# Patient Record
Sex: FEMALE | Race: WHITE | NOT HISPANIC OR LATINO
[De-identification: names, ages, dates, MRNs, and addresses within clinical notes are randomized per-mention and may not be internally consistent; named-entity substitution may affect disease eponyms.]

---

## 2021-08-15 PROBLEM — Z00.00 ENCOUNTER FOR PREVENTIVE HEALTH EXAMINATION: Status: ACTIVE | Noted: 2021-08-15

## 2021-08-31 ENCOUNTER — APPOINTMENT (OUTPATIENT)
Dept: PLASTIC SURGERY | Facility: CLINIC | Age: 56
End: 2021-08-31
Payer: COMMERCIAL

## 2021-08-31 VITALS
WEIGHT: 115.03 LBS | HEART RATE: 92 BPM | OXYGEN SATURATION: 98 % | SYSTOLIC BLOOD PRESSURE: 141 MMHG | TEMPERATURE: 97.3 F | DIASTOLIC BLOOD PRESSURE: 88 MMHG | RESPIRATION RATE: 16 BRPM | HEIGHT: 63 IN | BODY MASS INDEX: 20.38 KG/M2

## 2021-08-31 PROCEDURE — 99204 OFFICE O/P NEW MOD 45 MIN: CPT

## 2021-09-09 NOTE — ASSESSMENT
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 \par \par I feel that it is a reasonable plan to go to the operating room to remove both breast implants, and perform bilateral periprosthetic capsulectomies. In addition to addressing the above concerns, this may make breast cancer surveillance easier in the future.\par \par I advised that, given the extent of the injury to skin from the implants, she  will need a reconstructive mastopexy given breast shape, significant ptosis, skin quality, and will plan to do a mastopexy at the time of explantation.  \par \par Risks, benefits, and alternatives were discussed including the risks of infection, bleeding, seroma, hematoma, asymmetry, nipple necrosis, change in sensitivity of nipples, change in breast skin sensation, fat necrosis, oil cysts, poor scarring, keloid formation, hypertrophic scarring, dehiscence, and delayed wound healing.  The patient understands these risks, all questions were answered and she wishes to proceed with bilateral breast implant removal, bilateral periprosthetic capsulectomy, bilateral mastopexy,  to restore volume lost to atrophy directly related to the implants.  Informed consent was obtained.\par \par The plan is for removal of implants, capsulectomy, mastopexy, and re-insertion of silicone implants and alloderm\par \par

## 2021-09-09 NOTE — HISTORY OF PRESENT ILLNESS
[FreeTextEntry1] : Ms. 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 She  complains of bilateral breast pain associated with her  breast implants. She  states that the left breast pain is worse than the right.  She  is unable to sleep on her  stomach due to severe pain associated with the breast implants.  She  is also unable to do activities of daily life like running, biking, and lifting. \par \par Pt had her first Augmentation in 1991 which brought her from a B cup to a C cup, pt then suffered left side capsular contracture and rupture.  She had a second implant replacement surgery in 2008 with 350 cc silicone implants which brought her from a C to a D. \par \par She  states that she  has never felt comfortable with implants and is worried about the health risks.  She is concerned about the risk of anaplastic large cell lymphoma.\par \par She  has worried about the health implications of the silicone implants, and she  has had some systemic issues which she  is unsure are related to the implants.  Specifically has complaints of joint pain, hair loss, chronic fatigue, vision changes, and poor memory. She  has been recommended to have the implants removed. \par 	\par She  does not wish to have any more implants placed and I would not recommend doing that given the health concerns, and concerns for ALCL, and the painful bilateral capsular contractures\par \par

## 2021-10-19 ENCOUNTER — APPOINTMENT (OUTPATIENT)
Dept: PLASTIC SURGERY | Facility: CLINIC | Age: 56
End: 2021-10-19
Payer: COMMERCIAL

## 2021-10-19 VITALS
SYSTOLIC BLOOD PRESSURE: 157 MMHG | HEIGHT: 63 IN | RESPIRATION RATE: 16 BRPM | WEIGHT: 118 LBS | HEART RATE: 74 BPM | DIASTOLIC BLOOD PRESSURE: 93 MMHG | BODY MASS INDEX: 20.91 KG/M2 | OXYGEN SATURATION: 100 % | TEMPERATURE: 97.5 F

## 2021-10-19 PROCEDURE — 99214 OFFICE O/P EST MOD 30 MIN: CPT

## 2021-11-02 NOTE — HISTORY OF PRESENT ILLNESS
[FreeTextEntry1] : Ms. 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 She  complains of bilateral breast pain associated with her  breast implants. She  states that the left breast pain is worse than the right.  She  is unable to sleep on her  stomach due to severe pain associated with the breast implants.  She  is also unable to do activities of daily life like running, biking, and lifting. \par \par Pt had her first Augmentation in 1991 which brought her from a B cup to a C cup, pt then suffered left side capsular contracture and rupture.  She had a second implant replacement surgery in 2008 with 350 cc silicone implants which brought her from a C to a D. \par \par She  states that she  has never felt comfortable with implants and is worried about the health risks.  She is concerned about the risk of anaplastic large cell lymphoma.\par \par She  has worried about the health implications of the silicone implants, and she  has had some systemic issues which she  is unsure are related to the implants.  Specifically has complaints of joint pain, hair loss, chronic fatigue, vision changes, and poor memory. She  has been recommended to have the implants removed. \par 	\par She  does not wish to have any more implants placed and I would not recommend doing that given the health concerns, and concerns for ALCL, and the painful bilateral capsular contractures\par \par Pt has more questions about surgey\par

## 2021-11-02 NOTE — PHYSICAL EXAM
[NI] : Normal [de-identified] : Please see scanned in breast sheet\par SN-N: L - 26, R - 25 1/2\par N-IMF: L - 13, R - 12\par BW: L/R - 10 1/2

## 2021-11-02 NOTE — ASSESSMENT
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 \par \par I feel that it is a reasonable plan to go to the operating room to remove both breast implants, and perform bilateral periprosthetic capsulectomies. In addition to addressing the above concerns, this may make breast cancer surveillance easier in the future.\par \par I advised that, given the extent of the injury to skin from the implants, she  will need a reconstructive mastopexy given breast shape, significant ptosis, skin quality, and will plan to do a mastopexy at the time of explantation.  \par \par Risks, benefits, and alternatives were discussed including the risks of infection, bleeding, seroma, hematoma, asymmetry, nipple necrosis, change in sensitivity of nipples, change in breast skin sensation, fat necrosis, oil cysts, poor scarring, keloid formation, hypertrophic scarring, dehiscence, and delayed wound healing.  The patient understands these risks, all questions were answered and she wishes to proceed with bilateral breast implant removal, bilateral periprosthetic capsulectomy, bilateral reconstructive mastopexy to restore volume lost to atrophy directly related to the implants.  Informed consent was obtained.\par \par \par Pt would like to think about her options and will get back to us if she wants her implants removed or replaced\par

## 2021-11-03 ENCOUNTER — APPOINTMENT (OUTPATIENT)
Dept: PLASTIC SURGERY | Facility: HOSPITAL | Age: 56
End: 2021-11-03

## 2022-01-11 ENCOUNTER — OUTPATIENT (OUTPATIENT)
Dept: OUTPATIENT SERVICES | Facility: HOSPITAL | Age: 57
LOS: 1 days | End: 2022-01-11
Payer: COMMERCIAL

## 2022-01-11 ENCOUNTER — APPOINTMENT (OUTPATIENT)
Dept: PLASTIC SURGERY | Facility: CLINIC | Age: 57
End: 2022-01-11
Payer: COMMERCIAL

## 2022-01-11 VITALS
HEART RATE: 83 BPM | RESPIRATION RATE: 14 BRPM | OXYGEN SATURATION: 98 % | SYSTOLIC BLOOD PRESSURE: 142 MMHG | HEIGHT: 63 IN | BODY MASS INDEX: 21.09 KG/M2 | DIASTOLIC BLOOD PRESSURE: 78 MMHG | WEIGHT: 119 LBS | TEMPERATURE: 97.4 F

## 2022-01-11 VITALS
HEART RATE: 82 BPM | WEIGHT: 118.83 LBS | TEMPERATURE: 98 F | RESPIRATION RATE: 16 BRPM | HEIGHT: 63 IN | DIASTOLIC BLOOD PRESSURE: 80 MMHG | SYSTOLIC BLOOD PRESSURE: 124 MMHG

## 2022-01-11 DIAGNOSIS — Z01.818 ENCOUNTER FOR OTHER PREPROCEDURAL EXAMINATION: ICD-10-CM

## 2022-01-11 DIAGNOSIS — Z98.82 BREAST IMPLANT STATUS: ICD-10-CM

## 2022-01-11 DIAGNOSIS — Z98.82 BREAST IMPLANT STATUS: Chronic | ICD-10-CM

## 2022-01-11 DIAGNOSIS — T85.848A PAIN DUE TO OTHER INTERNAL PROSTHETIC DEVICES, IMPLANTS AND GRAFTS, INITIAL ENCOUNTER: ICD-10-CM

## 2022-01-11 DIAGNOSIS — T85.44XA CAPSULAR CONTRACTURE OF BREAST IMPLANT, INITIAL ENCOUNTER: ICD-10-CM

## 2022-01-11 DIAGNOSIS — Z29.9 ENCOUNTER FOR PROPHYLACTIC MEASURES, UNSPECIFIED: ICD-10-CM

## 2022-01-11 DIAGNOSIS — Z98.891 HISTORY OF UTERINE SCAR FROM PREVIOUS SURGERY: Chronic | ICD-10-CM

## 2022-01-11 LAB
ANION GAP SERPL CALC-SCNC: 11 MMOL/L — SIGNIFICANT CHANGE UP (ref 5–17)
APTT BLD: 29.6 SEC — SIGNIFICANT CHANGE UP (ref 27.5–35.5)
BASOPHILS # BLD AUTO: 0.04 K/UL — SIGNIFICANT CHANGE UP (ref 0–0.2)
BASOPHILS NFR BLD AUTO: 0.4 % — SIGNIFICANT CHANGE UP (ref 0–2)
BLD GP AB SCN SERPL QL: SIGNIFICANT CHANGE UP
BUN SERPL-MCNC: 11.2 MG/DL — SIGNIFICANT CHANGE UP (ref 8–20)
CALCIUM SERPL-MCNC: 9.9 MG/DL — SIGNIFICANT CHANGE UP (ref 8.6–10.2)
CHLORIDE SERPL-SCNC: 102 MMOL/L — SIGNIFICANT CHANGE UP (ref 98–107)
CO2 SERPL-SCNC: 26 MMOL/L — SIGNIFICANT CHANGE UP (ref 22–29)
CREAT SERPL-MCNC: 0.57 MG/DL — SIGNIFICANT CHANGE UP (ref 0.5–1.3)
EOSINOPHIL # BLD AUTO: 0.01 K/UL — SIGNIFICANT CHANGE UP (ref 0–0.5)
EOSINOPHIL NFR BLD AUTO: 0.1 % — SIGNIFICANT CHANGE UP (ref 0–6)
GLUCOSE SERPL-MCNC: 89 MG/DL — SIGNIFICANT CHANGE UP (ref 70–99)
HCT VFR BLD CALC: 42.6 % — SIGNIFICANT CHANGE UP (ref 34.5–45)
HGB BLD-MCNC: 13.5 G/DL — SIGNIFICANT CHANGE UP (ref 11.5–15.5)
IMM GRANULOCYTES NFR BLD AUTO: 0.2 % — SIGNIFICANT CHANGE UP (ref 0–1.5)
INR BLD: 0.95 RATIO — SIGNIFICANT CHANGE UP (ref 0.88–1.16)
LYMPHOCYTES # BLD AUTO: 2.54 K/UL — SIGNIFICANT CHANGE UP (ref 1–3.3)
LYMPHOCYTES # BLD AUTO: 27.2 % — SIGNIFICANT CHANGE UP (ref 13–44)
MCHC RBC-ENTMCNC: 30.4 PG — SIGNIFICANT CHANGE UP (ref 27–34)
MCHC RBC-ENTMCNC: 31.7 GM/DL — LOW (ref 32–36)
MCV RBC AUTO: 95.9 FL — SIGNIFICANT CHANGE UP (ref 80–100)
MONOCYTES # BLD AUTO: 0.64 K/UL — SIGNIFICANT CHANGE UP (ref 0–0.9)
MONOCYTES NFR BLD AUTO: 6.9 % — SIGNIFICANT CHANGE UP (ref 2–14)
NEUTROPHILS # BLD AUTO: 6.09 K/UL — SIGNIFICANT CHANGE UP (ref 1.8–7.4)
NEUTROPHILS NFR BLD AUTO: 65.2 % — SIGNIFICANT CHANGE UP (ref 43–77)
PLATELET # BLD AUTO: 477 K/UL — HIGH (ref 150–400)
POTASSIUM SERPL-MCNC: 4.7 MMOL/L — SIGNIFICANT CHANGE UP (ref 3.5–5.3)
POTASSIUM SERPL-SCNC: 4.7 MMOL/L — SIGNIFICANT CHANGE UP (ref 3.5–5.3)
PROTHROM AB SERPL-ACNC: 11 SEC — SIGNIFICANT CHANGE UP (ref 10.6–13.6)
RBC # BLD: 4.44 M/UL — SIGNIFICANT CHANGE UP (ref 3.8–5.2)
RBC # FLD: 11.9 % — SIGNIFICANT CHANGE UP (ref 10.3–14.5)
SODIUM SERPL-SCNC: 139 MMOL/L — SIGNIFICANT CHANGE UP (ref 135–145)
WBC # BLD: 9.34 K/UL — SIGNIFICANT CHANGE UP (ref 3.8–10.5)
WBC # FLD AUTO: 9.34 K/UL — SIGNIFICANT CHANGE UP (ref 3.8–10.5)

## 2022-01-11 PROCEDURE — G0463: CPT

## 2022-01-11 PROCEDURE — 93005 ELECTROCARDIOGRAM TRACING: CPT

## 2022-01-11 PROCEDURE — 93010 ELECTROCARDIOGRAM REPORT: CPT

## 2022-01-11 PROCEDURE — 99214 OFFICE O/P EST MOD 30 MIN: CPT

## 2022-01-11 RX ORDER — CEFAZOLIN SODIUM 1 G
2000 VIAL (EA) INJECTION ONCE
Refills: 0 | Status: DISCONTINUED | OUTPATIENT
Start: 2022-01-28 | End: 2022-02-11

## 2022-01-11 RX ORDER — SODIUM CHLORIDE 9 MG/ML
3 INJECTION INTRAMUSCULAR; INTRAVENOUS; SUBCUTANEOUS ONCE
Refills: 0 | Status: DISCONTINUED | OUTPATIENT
Start: 2022-01-28 | End: 2022-02-11

## 2022-01-11 NOTE — H&P PST ADULT - NSICDXPASTMEDICALHX_GEN_ALL_CORE_FT
PAST MEDICAL HISTORY:  Breast implant status 2007    Pain from breast implant      PAST MEDICAL HISTORY:  Breast implant status 2007    Capsular contracture of breast implant, initial encounter     Pain from breast implant

## 2022-01-11 NOTE — ASU PATIENT PROFILE, ADULT - FALL HARM RISK - UNIVERSAL INTERVENTIONS
Bed in lowest position, wheels locked, appropriate side rails in place/Call bell, personal items and telephone in reach/Instruct patient to call for assistance before getting out of bed or chair/Non-slip footwear when patient is out of bed/[place] to call system/Physically safe environment - no spills, clutter or unnecessary equipment/Purposeful Proactive Rounding/Room/bathroom lighting operational, light cord in reach

## 2022-01-11 NOTE — H&P PST ADULT - NSICDXFAMILYHX_GEN_ALL_CORE_FT
FAMILY HISTORY:  Father  Still living? Yes, Estimated age: Age Unknown  FHx: atrial fibrillation, Age at diagnosis: Age Unknown    Mother  Still living? Yes, Estimated age: Age Unknown  Family history of TIAs, Age at diagnosis: Age Unknown

## 2022-01-11 NOTE — H&P PST ADULT - NSICDXPASTSURGICALHX_GEN_ALL_CORE_FT
PAST SURGICAL HISTORY:  History of  x3     PAST SURGICAL HISTORY:  H/O breast augmentation     History of  x3

## 2022-01-11 NOTE — H&P PST ADULT - ASSESSMENT
Pt 57 y/o female seen today pre-op Removal of bilateral implant. Pt has a history of bilateral breast augmentation since 2007, endorsed she has developed bilateral breast pain associated with periprosthetic scarring. Pt report left breast pain is worse than her right breast, difficulty with lifting due to breast pain.  Berkley breast discharge, denies discoloration,  denies nipple skin changes, denies nipple retraction, denies mass. Pt seen today for a scheduled surgery on 22 with Dr. Owen. Surgery protocol reviewed with pt today.   MARALI VTE 2.0 SCORE [CLOT updated 2019]    AGE RELATED RISK FACTORS                                                       MOBILITY RELATED FACTORS  [x ] Age 41-60 years                                            (1 Point)                    [ ] Bed rest                                                        (1 Point)  [ ] Age: 61-74 years                                           (2 Points)                  [ ] Plaster cast                                                   (2 Points)  [ ] Age= 75 years                                              (3 Points)                    [ ] Bed bound for more than 72 hours                 (2 Points)    DISEASE RELATED RISK FACTORS                                               GENDER SPECIFIC FACTORS  [ ] Edema in the lower extremities                       (1 Point)              [ ] Pregnancy                                                     (1 Point)  [ ] Varicose veins                                               (1 Point)                     [ ] Post-partum < 6 weeks                                   (1 Point)             [ ] BMI > 25 Kg/m2                                            (1 Point)                     [ ] Hormonal therapy  or oral contraception          (1 Point)                 [ ] Sepsis (in the previous month)                        (1 Point)               [ ] History of pregnancy complications                 (1 point)  [ ] Pneumonia or serious lung disease                                               [ ] Unexplained or recurrent                     (1 Point)           (in the previous month)                               (1 Point)  [ ] Abnormal pulmonary function test                     (1 Point)                 SURGERY RELATED RISK FACTORS  [ ] Acute myocardial infarction                              (1 Point)               [ ]  Section                                             (1 Point)  [ ] Congestive heart failure (in the previous month)  (1 Point)      [ ] Minor surgery                                                  (1 Point)   [ ] Inflammatory bowel disease                             (1 Point)               [ ] Arthroscopic surgery                                        (2 Points)  [ ] Central venous access                                      (2 Points)                [x ] General surgery lasting more than 45 minutes (2 points)  [ ] Malignancy- Present or previous                   (2 Points)                [ ] Elective arthroplasty                                         (5 points)    [ ] Stroke (in the previous month)                          (5 Points)                                                                                                                                                           HEMATOLOGY RELATED FACTORS                                                 TRAUMA RELATED RISK FACTORS  [ ] Prior episodes of VTE                                     (3 Points)                [ ] Fracture of the hip, pelvis, or leg                       (5 Points)  [ ] Positive family history for VTE                         (3 Points)             [ ] Acute spinal cord injury (in the previous month)  (5 Points)  [ ] Prothrombin 59277 A                                     (3 Points)               [ ] Paralysis  (less than 1 month)                             (5 Points)  [ ] Factor V Leiden                                             (3 Points)                  [ ] Multiple Trauma within 1 month                        (5 Points)  [ ] Lupus anticoagulants                                     (3 Points)                                                           [ ] Anticardiolipin antibodies                               (3 Points)                                                       [ ] High homocysteine in the blood                      (3 Points)                                             [ ] Other congenital or acquired thrombophilia      (3 Points)                                                [ ] Heparin induced thrombocytopenia                  (3 Points)                                     Total Score [        3  ]  OPIOID RISK TOOL    LAITH EACH BOX THAT APPLIES AND ADD TOTALS AT THE END    FAMILY HISTORY OF SUBSTANCE ABUSE                 FEMALE         MALE                                                Alcohol                             [  ]1 pt          [  ]3pts                                               Illegal Durgs                     [  ]2 pts        [  ]3pts                                               Rx Drugs                           [  ]4 pts        [  ]4 pts    PERSONAL HISTORY OF SUBSTANCE ABUSE                                                                                          Alcohol                             [  ]3 pts       [  ]3 pts                                               Illegal Drugs                     [  ]4 pts        [  ]4 pts                                               Rx Drugs                           [  ]5 pts        [  ]5 pts    AGE BETWEEN 16-45 YEARS                                      [  ]1 pt         [  ]1 pt    HISTORY OF PREADOLESCENT   SEXUAL ABUSE                                                             [  ]3 pts        [  ]0pts    PSYCHOLOGICAL DISEASE                     ADD, OCD, Bipolar, Schizophrenia        [  ]2 pts         [  ]2 pts                      Depression                                               [  ]1 pt           [  ]1 pt           SCORING TOTAL   (add numbers and type here)              (*0**)                                     A score of 3 or lower indicated LOW risk for future opioid abuse  A score of 4 to 7 indicated moderate risk for future opioid abuse  A score of 8 or higher indicates a high risk for opioid abuse Pt 55 y/o female seen today pre-op Removal of bilateral implants, capsulectomy, muscle repair.  Pt has a history of bilateral breast augmentation since 2007, endorsed she has developed bilateral breast pain associated with periprosthetic scarring. Pt report left breast pain is worse than her right breast, difficulty with lifting due to breast pain.  Berkley breast discharge, denies discoloration,  denies nipple skin changes, denies nipple retraction, denies mass. Pt seen today for a scheduled surgery on 22 with Dr. Owen. Surgery protocol reviewed with pt today.   MARALI VTE 2.0 SCORE [CLOT updated 2019]    AGE RELATED RISK FACTORS                                                       MOBILITY RELATED FACTORS  [x ] Age 41-60 years                                            (1 Point)                    [ ] Bed rest                                                        (1 Point)  [ ] Age: 61-74 years                                           (2 Points)                  [ ] Plaster cast                                                   (2 Points)  [ ] Age= 75 years                                              (3 Points)                    [ ] Bed bound for more than 72 hours                 (2 Points)    DISEASE RELATED RISK FACTORS                                               GENDER SPECIFIC FACTORS  [ ] Edema in the lower extremities                       (1 Point)              [ ] Pregnancy                                                     (1 Point)  [ ] Varicose veins                                               (1 Point)                     [ ] Post-partum < 6 weeks                                   (1 Point)             [ ] BMI > 25 Kg/m2                                            (1 Point)                     [ ] Hormonal therapy  or oral contraception          (1 Point)                 [ ] Sepsis (in the previous month)                        (1 Point)               [ ] History of pregnancy complications                 (1 point)  [ ] Pneumonia or serious lung disease                                               [ ] Unexplained or recurrent                     (1 Point)           (in the previous month)                               (1 Point)  [ ] Abnormal pulmonary function test                     (1 Point)                 SURGERY RELATED RISK FACTORS  [ ] Acute myocardial infarction                              (1 Point)               [ ]  Section                                             (1 Point)  [ ] Congestive heart failure (in the previous month)  (1 Point)      [ ] Minor surgery                                                  (1 Point)   [ ] Inflammatory bowel disease                             (1 Point)               [ ] Arthroscopic surgery                                        (2 Points)  [ ] Central venous access                                      (2 Points)                [x ] General surgery lasting more than 45 minutes (2 points)  [ ] Malignancy- Present or previous                   (2 Points)                [ ] Elective arthroplasty                                         (5 points)    [ ] Stroke (in the previous month)                          (5 Points)                                                                                                                                                           HEMATOLOGY RELATED FACTORS                                                 TRAUMA RELATED RISK FACTORS  [ ] Prior episodes of VTE                                     (3 Points)                [ ] Fracture of the hip, pelvis, or leg                       (5 Points)  [ ] Positive family history for VTE                         (3 Points)             [ ] Acute spinal cord injury (in the previous month)  (5 Points)  [ ] Prothrombin 24822 A                                     (3 Points)               [ ] Paralysis  (less than 1 month)                             (5 Points)  [ ] Factor V Leiden                                             (3 Points)                  [ ] Multiple Trauma within 1 month                        (5 Points)  [ ] Lupus anticoagulants                                     (3 Points)                                                           [ ] Anticardiolipin antibodies                               (3 Points)                                                       [ ] High homocysteine in the blood                      (3 Points)                                             [ ] Other congenital or acquired thrombophilia      (3 Points)                                                [ ] Heparin induced thrombocytopenia                  (3 Points)                                     Total Score [        3  ]  OPIOID RISK TOOL    LAITH EACH BOX THAT APPLIES AND ADD TOTALS AT THE END    FAMILY HISTORY OF SUBSTANCE ABUSE                 FEMALE         MALE                                                Alcohol                             [  ]1 pt          [  ]3pts                                               Illegal Durgs                     [  ]2 pts        [  ]3pts                                               Rx Drugs                           [  ]4 pts        [  ]4 pts    PERSONAL HISTORY OF SUBSTANCE ABUSE                                                                                          Alcohol                             [  ]3 pts       [  ]3 pts                                               Illegal Drugs                     [  ]4 pts        [  ]4 pts                                               Rx Drugs                           [  ]5 pts        [  ]5 pts    AGE BETWEEN 16-45 YEARS                                      [  ]1 pt         [  ]1 pt    HISTORY OF PREADOLESCENT   SEXUAL ABUSE                                                             [  ]3 pts        [  ]0pts    PSYCHOLOGICAL DISEASE                     ADD, OCD, Bipolar, Schizophrenia        [  ]2 pts         [  ]2 pts                      Depression                                               [  ]1 pt           [  ]1 pt           SCORING TOTAL   (add numbers and type here)              (*0**)                                     A score of 3 or lower indicated LOW risk for future opioid abuse  A score of 4 to 7 indicated moderate risk for future opioid abuse  A score of 8 or higher indicates a high risk for opioid abuse

## 2022-01-11 NOTE — H&P PST ADULT - HISTORY OF PRESENT ILLNESS
Pt 55 y/o female seen today pre-op Removal of bilateral implant who has a history of bilateral breast augmentation, who has developed bilateral breast pain associated with periprosthetic scarring. She complains of bilateral breast pain associated with her breast implants. She states that the left breast pain is worse than the right. She is unable to sleep on her stomach due to severe pain associated with the breast implants. She is also unable to do activities of daily life like running, biking, and lifting.   Pt 55 y/o female seen today pre-op Removal of bilateral implant. Pt has a history of bilateral breast augmentation since 2007, endorsed she has developed bilateral breast pain associated with periprosthetic scarring. Pt report left breast pain is worse than her right breast, difficulty with lifting due to breast pain.  Berkley breast discharge, denies discoloration,  denies nipple skin changes, denies nipple retraction, denies mass. Pt seen today for a scheduled surgery on 1/28/22 with Dr. Owen.    Pt 57 y/o female seen today pre-op Removal of bilateral implants,  capsulectomy, muscle repair. Pt has a history of bilateral breast augmentation since 2007, endorsed she has developed bilateral breast pain associated with periprosthetic scarring. Pt report left breast pain is worse than her right breast, difficulty with lifting due to breast pain.  Berkley breast discharge, denies discoloration,  denies nipple skin changes, denies nipple retraction, denies mass. Pt seen today for a scheduled surgery on 1/28/22 with Dr. Owen.

## 2022-01-24 ENCOUNTER — APPOINTMENT (OUTPATIENT)
Dept: PLASTIC SURGERY | Facility: CLINIC | Age: 57
End: 2022-01-24

## 2022-01-24 PROBLEM — T85.44XA CAPSULAR CONTRACTURE OF BREAST IMPLANT, INITIAL ENCOUNTER: Chronic | Status: ACTIVE | Noted: 2022-01-11

## 2022-01-24 PROBLEM — T85.848A PAIN DUE TO OTHER INTERNAL PROSTHETIC DEVICES, IMPLANTS AND GRAFTS, INITIAL ENCOUNTER: Chronic | Status: ACTIVE | Noted: 2022-01-11

## 2022-01-27 ENCOUNTER — TRANSCRIPTION ENCOUNTER (OUTPATIENT)
Age: 57
End: 2022-01-27

## 2022-01-27 NOTE — PROGRESS NOTE ADULT - SUBJECTIVE AND OBJECTIVE BOX
When i reviewed the chart i noticed that the patient has plat. of 477 and she is also on hormonal treatment without clear reason , putting the patient at higher risk of DVT , Dr Ronan Owen will be informed .

## 2022-01-28 ENCOUNTER — RESULT REVIEW (OUTPATIENT)
Age: 57
End: 2022-01-28

## 2022-01-28 ENCOUNTER — OUTPATIENT (OUTPATIENT)
Dept: OUTPATIENT SERVICES | Facility: HOSPITAL | Age: 57
LOS: 1 days | End: 2022-01-28
Payer: COMMERCIAL

## 2022-01-28 ENCOUNTER — APPOINTMENT (OUTPATIENT)
Dept: PLASTIC SURGERY | Facility: HOSPITAL | Age: 57
End: 2022-01-28
Payer: COMMERCIAL

## 2022-01-28 VITALS
SYSTOLIC BLOOD PRESSURE: 150 MMHG | DIASTOLIC BLOOD PRESSURE: 76 MMHG | HEART RATE: 88 BPM | TEMPERATURE: 98 F | RESPIRATION RATE: 15 BRPM | OXYGEN SATURATION: 100 % | WEIGHT: 117.95 LBS | HEIGHT: 63 IN

## 2022-01-28 VITALS
TEMPERATURE: 98 F | DIASTOLIC BLOOD PRESSURE: 79 MMHG | HEART RATE: 85 BPM | SYSTOLIC BLOOD PRESSURE: 127 MMHG | RESPIRATION RATE: 16 BRPM | OXYGEN SATURATION: 100 %

## 2022-01-28 DIAGNOSIS — Z01.818 ENCOUNTER FOR OTHER PREPROCEDURAL EXAMINATION: ICD-10-CM

## 2022-01-28 DIAGNOSIS — Z98.891 HISTORY OF UTERINE SCAR FROM PREVIOUS SURGERY: Chronic | ICD-10-CM

## 2022-01-28 DIAGNOSIS — T85.44XA CAPSULAR CONTRACTURE OF BREAST IMPLANT, INITIAL ENCOUNTER: ICD-10-CM

## 2022-01-28 DIAGNOSIS — Z98.82 BREAST IMPLANT STATUS: Chronic | ICD-10-CM

## 2022-01-28 DIAGNOSIS — T85.848A PAIN DUE TO OTHER INTERNAL PROSTHETIC DEVICES, IMPLANTS AND GRAFTS, INITIAL ENCOUNTER: ICD-10-CM

## 2022-01-28 LAB
BASOPHILS # BLD AUTO: 0.04 K/UL — SIGNIFICANT CHANGE UP (ref 0–0.2)
BASOPHILS NFR BLD AUTO: 0.6 % — SIGNIFICANT CHANGE UP (ref 0–2)
EOSINOPHIL # BLD AUTO: 0.04 K/UL — SIGNIFICANT CHANGE UP (ref 0–0.5)
EOSINOPHIL NFR BLD AUTO: 0.6 % — SIGNIFICANT CHANGE UP (ref 0–6)
HCT VFR BLD CALC: 40.5 % — SIGNIFICANT CHANGE UP (ref 34.5–45)
HGB BLD-MCNC: 13.4 G/DL — SIGNIFICANT CHANGE UP (ref 11.5–15.5)
IMM GRANULOCYTES NFR BLD AUTO: 0.3 % — SIGNIFICANT CHANGE UP (ref 0–1.5)
LYMPHOCYTES # BLD AUTO: 2.32 K/UL — SIGNIFICANT CHANGE UP (ref 1–3.3)
LYMPHOCYTES # BLD AUTO: 36.3 % — SIGNIFICANT CHANGE UP (ref 13–44)
MCHC RBC-ENTMCNC: 31.1 PG — SIGNIFICANT CHANGE UP (ref 27–34)
MCHC RBC-ENTMCNC: 33.1 GM/DL — SIGNIFICANT CHANGE UP (ref 32–36)
MCV RBC AUTO: 94 FL — SIGNIFICANT CHANGE UP (ref 80–100)
MONOCYTES # BLD AUTO: 0.54 K/UL — SIGNIFICANT CHANGE UP (ref 0–0.9)
MONOCYTES NFR BLD AUTO: 8.4 % — SIGNIFICANT CHANGE UP (ref 2–14)
NEUTROPHILS # BLD AUTO: 3.44 K/UL — SIGNIFICANT CHANGE UP (ref 1.8–7.4)
NEUTROPHILS NFR BLD AUTO: 53.8 % — SIGNIFICANT CHANGE UP (ref 43–77)
PLATELET # BLD AUTO: 367 K/UL — SIGNIFICANT CHANGE UP (ref 150–400)
RBC # BLD: 4.31 M/UL — SIGNIFICANT CHANGE UP (ref 3.8–5.2)
RBC # FLD: 12.1 % — SIGNIFICANT CHANGE UP (ref 10.3–14.5)
WBC # BLD: 6.4 K/UL — SIGNIFICANT CHANGE UP (ref 3.8–10.5)
WBC # FLD AUTO: 6.4 K/UL — SIGNIFICANT CHANGE UP (ref 3.8–10.5)

## 2022-01-28 PROCEDURE — 19350 NIPPLE/AREOLA RECONSTRUCTION: CPT | Mod: 50

## 2022-01-28 PROCEDURE — 88305 TISSUE EXAM BY PATHOLOGIST: CPT | Mod: 26

## 2022-01-28 PROCEDURE — 19371 PERI-IMPLT CAPSLC BRST COMPL: CPT | Mod: 50

## 2022-01-28 PROCEDURE — 88300 SURGICAL PATH GROSS: CPT | Mod: 26,59

## 2022-01-28 PROCEDURE — 15734 MUSCLE-SKIN GRAFT TRUNK: CPT

## 2022-01-28 DEVICE — IMPLANTABLE DEVICE
Type: IMPLANTABLE DEVICE | Status: NON-FUNCTIONAL
Removed: 2022-01-28

## 2022-01-28 RX ORDER — ACETAMINOPHEN 500 MG
975 TABLET ORAL ONCE
Refills: 0 | Status: COMPLETED | OUTPATIENT
Start: 2022-01-28 | End: 2022-01-28

## 2022-01-28 RX ORDER — FENTANYL CITRATE 50 UG/ML
25 INJECTION INTRAVENOUS
Refills: 0 | Status: DISCONTINUED | OUTPATIENT
Start: 2022-01-28 | End: 2022-01-28

## 2022-01-28 RX ORDER — SODIUM CHLORIDE 9 MG/ML
1000 INJECTION, SOLUTION INTRAVENOUS
Refills: 0 | Status: DISCONTINUED | OUTPATIENT
Start: 2022-01-28 | End: 2022-01-28

## 2022-01-28 RX ORDER — APREPITANT 80 MG/1
40 CAPSULE ORAL ONCE
Refills: 0 | Status: COMPLETED | OUTPATIENT
Start: 2022-01-28 | End: 2022-01-28

## 2022-01-28 RX ADMIN — FENTANYL CITRATE 25 MICROGRAM(S): 50 INJECTION INTRAVENOUS at 14:37

## 2022-01-28 RX ADMIN — FENTANYL CITRATE 25 MICROGRAM(S): 50 INJECTION INTRAVENOUS at 14:47

## 2022-01-28 RX ADMIN — FENTANYL CITRATE 25 MICROGRAM(S): 50 INJECTION INTRAVENOUS at 15:07

## 2022-01-28 RX ADMIN — FENTANYL CITRATE 25 MICROGRAM(S): 50 INJECTION INTRAVENOUS at 14:25

## 2022-01-28 RX ADMIN — FENTANYL CITRATE 25 MICROGRAM(S): 50 INJECTION INTRAVENOUS at 14:55

## 2022-01-28 RX ADMIN — Medication 975 MILLIGRAM(S): at 09:07

## 2022-01-28 RX ADMIN — FENTANYL CITRATE 25 MICROGRAM(S): 50 INJECTION INTRAVENOUS at 14:35

## 2022-01-28 RX ADMIN — APREPITANT 40 MILLIGRAM(S): 80 CAPSULE ORAL at 09:51

## 2022-01-28 RX ADMIN — FENTANYL CITRATE 25 MICROGRAM(S): 50 INJECTION INTRAVENOUS at 15:10

## 2022-01-28 RX ADMIN — FENTANYL CITRATE 25 MICROGRAM(S): 50 INJECTION INTRAVENOUS at 15:25

## 2022-01-28 NOTE — ASU PREOP CHECKLIST - ADVANCE DIRECTIVE ADDRESSED/READDRESSED
-- DO NOT REPLY / DO NOT REPLY ALL --  -- Message is from the [facility]--    COVID-19 Universal Screening: N/A - Not about scheduling    General Patient Message      Reason for Call: patient called for a refill for his medication atenolol (TENORMIN) 25 MG tablet, he stated that he is not going to schedule a appointment due to the pandemic, he stated please call and advise     Caller Information       Type Contact Phone    12/01/2020 12:46 PM CST Phone (Incoming) Severiano Kebede (Self) [phone] (M)          Alternative phone number: none    Turnaround time given to caller:   \"This message will be sent to [state Provider's name]. The clinical team will fulfill your request as soon as they review your message.\"     done

## 2022-01-28 NOTE — ASU DISCHARGE PLAN (ADULT/PEDIATRIC) - ASU DC SPECIAL INSTRUCTIONSFT
Dressings: Leave dressings intact and dry for 48 hours, after 48 hours you may remove the outer gauze only. Leave the surgical glue tape intact, they will fall off on their own.     Bathing: Do not get dressings wet for 48 hours.  Once outer dressings are removed you may shower, do not scrub directly on the incisions. No dressing is required after shower, pat the area dry gently. Replace surgical bra when finished.    Drains: Empty and record the drain amount from each drain separately. Write down Date, Time, and Amount for each drain separately on a piece of paper and bring the paper to the office at your follow up visit.     Activity: No heavy lifting, or strenuous activity, you may walk for exercise    Diet: No change    Meds: You should alternate taking Tylenol and Motrin for pain ever 4-6 hours.  Take Oxycodone for severe pain only.     Follow up: call the office to make a follow up appointment in 1 week Dressings: Leave dressings intact and dry for 48 hours, after 48 hours you may remove the outer gauze only. Leave the surgical glue tape intact, they will fall off on their own.     Bathing: Do not get dressings wet for 48 hours.  Once outer dressings are removed you may shower, do not scrub directly on the incisions. No dressing is required after shower, pat the area dry gently. Replace surgical bra when finished.    Drains: Empty and record the drain amount from each drain separately. Write down Date, Time, and Amount for each drain separately on a piece of paper and bring the paper to the office at your follow up visit.     Activity: No heavy lifting, or strenuous activity, you may walk for exercise    Diet: No change    Meds: You should alternate taking Tylenol and Motrin for pain ever 4-6 hours.  Take Oxycodone for severe pain only.   Take antibiotics as prescribed for the next 3 days    Follow up: call the office to make a follow up appointment in 1 week

## 2022-01-28 NOTE — ASU DISCHARGE PLAN (ADULT/PEDIATRIC) - NS MD DC FALL RISK RISK
For information on Fall & Injury Prevention, visit: [email or web address] OR  [email or web address] OR  https://www.cdc.gov/steadi/patient.html

## 2022-01-28 NOTE — ASU DISCHARGE PLAN (ADULT/PEDIATRIC) - CARE PROVIDER_API CALL
Ronan Owen)  Plastic Surgery; Surgery; Surgery of the Hand  [address], [address]  [place] [address]  Phone: [phone]  Fax: [phone]  Scheduled Appointment: 02/01/2022

## 2022-01-31 ENCOUNTER — NON-APPOINTMENT (OUTPATIENT)
Age: 57
End: 2022-01-31

## 2022-01-31 PROCEDURE — 15777 ACELLULAR DERM MATRIX IMPLT: CPT

## 2022-01-31 PROCEDURE — 85025 COMPLETE CBC W/AUTO DIFF WBC: CPT

## 2022-01-31 PROCEDURE — 19316 MASTOPEXY: CPT | Mod: 50

## 2022-01-31 PROCEDURE — C9399: CPT

## 2022-01-31 PROCEDURE — 36415 COLL VENOUS BLD VENIPUNCTURE: CPT

## 2022-01-31 PROCEDURE — 88305 TISSUE EXAM BY PATHOLOGIST: CPT

## 2022-01-31 PROCEDURE — 99261: CPT

## 2022-01-31 PROCEDURE — 88300 SURGICAL PATH GROSS: CPT

## 2022-01-31 PROCEDURE — 19371 PERI-IMPLT CAPSLC BRST COMPL: CPT | Mod: 50

## 2022-02-01 ENCOUNTER — APPOINTMENT (OUTPATIENT)
Dept: PLASTIC SURGERY | Facility: CLINIC | Age: 57
End: 2022-02-01
Payer: COMMERCIAL

## 2022-02-01 VITALS
OXYGEN SATURATION: 98 % | TEMPERATURE: 97.6 F | HEIGHT: 63 IN | WEIGHT: 118 LBS | BODY MASS INDEX: 20.91 KG/M2 | RESPIRATION RATE: 14 BRPM | HEART RATE: 87 BPM | DIASTOLIC BLOOD PRESSURE: 83 MMHG | SYSTOLIC BLOOD PRESSURE: 132 MMHG

## 2022-02-01 PROCEDURE — 99024 POSTOP FOLLOW-UP VISIT: CPT

## 2022-02-01 NOTE — HISTORY OF PRESENT ILLNESS
[FreeTextEntry1] : Ms. 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 Pt is now s/p b/l breast implant removal, capsulectomy, mastopexy, and alloderm placement 1/28/22\par pt presents today for routine follow up\par Drains\par R- 25\par L- 25\par Denies fever, chills, LE pain/edema\par Pain is manageable at this time.

## 2022-02-01 NOTE — ASSESSMENT
[FreeTextEntry1] : 55 yo female s/p b/l implant removal, capsulectomy, mastopexy, alloderm placement 1/28/22\par pt doing ok\par drains left in place due to drainage and placement of alloderm\par discussed with pt to not put too much pressure or compress the left nipple with tight bras or clothing, monitor for color changes, darkening, leave bra open at some times during the day to help promote circulation\par monitor for redness, swelling, fever, chills\par no heavy lifting or strenuous activity\par continue to wear soft, non wire bra\par all pt questions answered\par

## 2022-02-01 NOTE — PHYSICAL EXAM
[NI] : Normal [de-identified] : b/l breasts are soft and symmetrical\par incisions c/d/i\par nipples viable\par drains in place and functioning\par no palpable fluid collections or signs of infection\par healing ecchymosis b/l \par left nipple with brisk capillary refill <1 sec

## 2022-02-03 LAB — SURGICAL PATHOLOGY STUDY: SIGNIFICANT CHANGE UP

## 2022-02-08 ENCOUNTER — APPOINTMENT (OUTPATIENT)
Dept: PLASTIC SURGERY | Facility: CLINIC | Age: 57
End: 2022-02-08
Payer: COMMERCIAL

## 2022-02-08 VITALS
HEIGHT: 63 IN | HEART RATE: 81 BPM | DIASTOLIC BLOOD PRESSURE: 87 MMHG | OXYGEN SATURATION: 95 % | TEMPERATURE: 97.4 F | BODY MASS INDEX: 20.91 KG/M2 | WEIGHT: 118 LBS | RESPIRATION RATE: 16 BRPM | SYSTOLIC BLOOD PRESSURE: 131 MMHG

## 2022-02-08 PROCEDURE — 99024 POSTOP FOLLOW-UP VISIT: CPT

## 2022-02-08 NOTE — ASSESSMENT
[FreeTextEntry1] : 55 yo female s/p b/l implant removal, capsulectomy, mastopexy, alloderm placement 1/28/22\par pt doing better\par pt seen and examined with Dr Owen\par drains removed today without difficulty\par monitor for redness, swelling, fever, chills\par no heavy lifting or strenuous activity\par continue to wear soft, non wire bra\par all pt questions answered\par

## 2022-02-08 NOTE — PHYSICAL EXAM
[NI] : Normal [de-identified] : b/l breasts are soft and symmetrical\par incisions c/d/i\par nipples viable\par drains in place and functioning\par no palpable fluid collections or signs of infection\par healing ecchymosis b/l

## 2022-02-10 NOTE — ASSESSMENT
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 \par \par I feel that it is a reasonable plan to go to the operating room to remove both breast implants, and perform bilateral periprosthetic capsulectomies. In addition to addressing the above concerns, this may make breast cancer surveillance easier in the future.\par \par I advised that, given the extent of the injury to skin from the implants, she  will need a reconstructive mastopexy given breast shape, significant ptosis, skin quality, and will plan to do a mastopexy at the time of explantation.  \par \par Risks, benefits, and alternatives were discussed including the risks of infection, bleeding, seroma, hematoma, asymmetry, nipple necrosis, change in sensitivity of nipples, change in breast skin sensation, fat necrosis, oil cysts, poor scarring, keloid formation, hypertrophic scarring, dehiscence, and delayed wound healing.  The patient understands these risks, all questions were answered and she wishes to proceed with bilateral breast implant removal, bilateral periprosthetic capsulectomy, bilateral reconstructive mastopexy to restore volume lost to atrophy directly related to the implants.  Informed consent was obtained.\par \par

## 2022-02-10 NOTE — PHYSICAL EXAM
[NI] : Normal [de-identified] : Please see scanned in breast sheet\par SN-N: L - 26, R - 25 1/2\par N-IMF: L - 13, R - 12\par BW: L/R - 10 1/2

## 2022-02-21 LAB
ALBUMIN SERPL ELPH-MCNC: 4.8 G/DL
ALP BLD-CCNC: 90 U/L
ALT SERPL-CCNC: 37 U/L
ANION GAP SERPL CALC-SCNC: 15 MMOL/L
AST SERPL-CCNC: 31 U/L
BASOPHILS # BLD AUTO: 0.06 K/UL
BASOPHILS NFR BLD AUTO: 0.9 %
BILIRUB SERPL-MCNC: 0.2 MG/DL
BUN SERPL-MCNC: 13 MG/DL
CALCIUM SERPL-MCNC: 10.5 MG/DL
CHLORIDE SERPL-SCNC: 100 MMOL/L
CO2 SERPL-SCNC: 26 MMOL/L
CREAT SERPL-MCNC: 0.61 MG/DL
EOSINOPHIL # BLD AUTO: 0.09 K/UL
EOSINOPHIL NFR BLD AUTO: 1.4 %
GLUCOSE SERPL-MCNC: 69 MG/DL
HCT VFR BLD CALC: 40.8 %
HGB BLD-MCNC: 12.6 G/DL
IMM GRANULOCYTES NFR BLD AUTO: 0.3 %
LYMPHOCYTES # BLD AUTO: 2.92 K/UL
LYMPHOCYTES NFR BLD AUTO: 45.3 %
MAGNESIUM SERPL-MCNC: 2 MG/DL
MAN DIFF?: NORMAL
MCHC RBC-ENTMCNC: 30.6 PG
MCHC RBC-ENTMCNC: 30.9 GM/DL
MCV RBC AUTO: 99 FL
MONOCYTES # BLD AUTO: 0.57 K/UL
MONOCYTES NFR BLD AUTO: 8.9 %
NEUTROPHILS # BLD AUTO: 2.78 K/UL
NEUTROPHILS NFR BLD AUTO: 43.2 %
PHOSPHATE SERPL-MCNC: 4.9 MG/DL
PLATELET # BLD AUTO: 511 K/UL
POTASSIUM SERPL-SCNC: 4.9 MMOL/L
PROT SERPL-MCNC: 7.2 G/DL
RBC # BLD: 4.12 M/UL
RBC # FLD: 12.3 %
SODIUM SERPL-SCNC: 141 MMOL/L
WBC # FLD AUTO: 6.44 K/UL

## 2022-02-22 ENCOUNTER — APPOINTMENT (OUTPATIENT)
Dept: PLASTIC SURGERY | Facility: CLINIC | Age: 57
End: 2022-02-22
Payer: COMMERCIAL

## 2022-02-22 VITALS
HEART RATE: 80 BPM | RESPIRATION RATE: 16 BRPM | OXYGEN SATURATION: 97 % | TEMPERATURE: 97.6 F | DIASTOLIC BLOOD PRESSURE: 75 MMHG | SYSTOLIC BLOOD PRESSURE: 126 MMHG | BODY MASS INDEX: 21.26 KG/M2 | HEIGHT: 63 IN | WEIGHT: 120 LBS

## 2022-02-22 PROCEDURE — 99024 POSTOP FOLLOW-UP VISIT: CPT

## 2022-02-22 NOTE — ASSESSMENT
[FreeTextEntry1] : 57 yo female s/p b/l breast implant removal, capsulectomy, mastopexy, and alloderm placement 1/28/22\par pt doing better\par discussed with pt that due to the syncopal episode and the elevated platelets she should follow up with her PCP to for further testing\par blood work discussed today\par monitor for redness, swelling, fever, chills\par no heavy lifting or strenuous activity\par continue to wear soft, non wire bra\par all pt questions answered\par

## 2022-02-22 NOTE — PHYSICAL EXAM
[NI] : Normal [de-identified] : b/l breasts are soft and symmetrical\par incisions c/d/i\par nipples viable\par no palpable fluid collections or signs of infection

## 2022-03-15 ENCOUNTER — APPOINTMENT (OUTPATIENT)
Dept: PLASTIC SURGERY | Facility: CLINIC | Age: 57
End: 2022-03-15
Payer: COMMERCIAL

## 2022-03-15 VITALS
SYSTOLIC BLOOD PRESSURE: 143 MMHG | RESPIRATION RATE: 16 BRPM | TEMPERATURE: 97.7 F | BODY MASS INDEX: 21.44 KG/M2 | HEART RATE: 77 BPM | OXYGEN SATURATION: 100 % | HEIGHT: 63 IN | DIASTOLIC BLOOD PRESSURE: 81 MMHG | WEIGHT: 121 LBS

## 2022-03-15 PROCEDURE — 99024 POSTOP FOLLOW-UP VISIT: CPT

## 2022-03-15 NOTE — PHYSICAL EXAM
[NI] : Normal [de-identified] : b/l breasts are soft and symmetrical\par incisions c/d/i\par nipples viable\par no palpable fluid collections or signs of infection\par there is an area of medially when patient bends over with contour irregularity

## 2022-03-15 NOTE — HISTORY OF PRESENT ILLNESS
[FreeTextEntry1] : Ms. 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 Pt is now s/p b/l breast implant removal, capsulectomy, mastopexy, and alloderm placement 1/28/22\par pt presents today for routine follow up\par Denies fever, chills, LE pain/edema\par \par pt doing better\par

## 2022-03-15 NOTE — ASSESSMENT
[FreeTextEntry1] : 57 yo female s/p b/l breast implant removal, capsulectomy, mastopexy, alloderm placement 1/28/22\par pt doing better\par discussed massages at today's visit \par also discussed applying sunscreen to the scars\par no restrictions\par all questions answered

## 2022-06-28 ENCOUNTER — APPOINTMENT (OUTPATIENT)
Dept: PLASTIC SURGERY | Facility: CLINIC | Age: 57
End: 2022-06-28
Payer: COMMERCIAL

## 2022-06-28 VITALS
OXYGEN SATURATION: 100 % | SYSTOLIC BLOOD PRESSURE: 135 MMHG | HEART RATE: 73 BPM | WEIGHT: 118 LBS | HEIGHT: 63 IN | DIASTOLIC BLOOD PRESSURE: 79 MMHG | RESPIRATION RATE: 14 BRPM | BODY MASS INDEX: 20.91 KG/M2 | TEMPERATURE: 96.7 F

## 2022-06-28 PROCEDURE — 99212 OFFICE O/P EST SF 10 MIN: CPT

## 2022-06-28 RX ORDER — AZITHROMYCIN 250 MG/1
250 TABLET, FILM COATED ORAL
Qty: 6 | Refills: 0 | Status: ACTIVE | COMMUNITY
Start: 2022-05-12

## 2022-06-28 RX ORDER — PHENAZOPYRIDINE HYDROCHLORIDE 200 MG/1
200 TABLET ORAL
Qty: 9 | Refills: 0 | Status: ACTIVE | COMMUNITY
Start: 2022-05-03

## 2022-06-28 RX ORDER — HYDROCODONE BITARTRATE AND HOMATROPINE METHYLBROMIDE 5; 1.5 MG/5ML; MG/5ML
5-1.5 SOLUTION ORAL
Qty: 120 | Refills: 0 | Status: ACTIVE | COMMUNITY
Start: 2022-05-12

## 2022-06-28 RX ORDER — AMOXICILLIN 500 MG/1
500 CAPSULE ORAL
Qty: 21 | Refills: 0 | Status: ACTIVE | COMMUNITY
Start: 2022-06-24

## 2022-06-28 RX ORDER — ACETAMINOPHEN AND CODEINE 300; 30 MG/1; MG/1
300-30 TABLET ORAL
Qty: 16 | Refills: 0 | Status: ACTIVE | COMMUNITY
Start: 2022-06-25

## 2022-06-28 RX ORDER — METHYLPREDNISOLONE 4 MG/1
4 TABLET ORAL
Qty: 21 | Refills: 0 | Status: ACTIVE | COMMUNITY
Start: 2022-05-12

## 2022-06-28 RX ORDER — IBUPROFEN 600 MG/1
600 TABLET, FILM COATED ORAL
Qty: 20 | Refills: 0 | Status: ACTIVE | COMMUNITY
Start: 2022-06-24

## 2022-06-28 RX ORDER — CIPROFLOXACIN HYDROCHLORIDE 500 MG/1
500 TABLET, FILM COATED ORAL
Qty: 14 | Refills: 0 | Status: ACTIVE | COMMUNITY
Start: 2022-05-03

## 2022-06-28 NOTE — PHYSICAL EXAM
[NI] : Normal [de-identified] : b/l breasts are soft and symmetrical\par incisions well healed\par nipples viable\par no palpable fluid collections or signs of infection\par there is an area of medially when patient bends over with contour irregularity \par she has b/l animation deformity

## 2022-06-28 NOTE — ASSESSMENT
[FreeTextEntry1] : 55 yo female s/p b/l breast implant removal, capsulectomy, mastopexy 1/28/22\par pt seen and examined with Dr. Owen today\par options discussed for revision due to animation deformity including placing alloderm between the breast tissue and muscle, possible fat grafting, possible aggressive mastopexy for coverage.  She would like to think about her options and wait 1 full year from her surgery after everything has healed and she will continue scar massages in the mean time. \par no restrictions\par all questions answered

## 2022-06-28 NOTE — HISTORY OF PRESENT ILLNESS
[FreeTextEntry1] : Ms. 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 Pt is now s/p b/l breast implant removal, capsulectomy, mastopexy, and alloderm placement 1/28/22\par pt presents today for routine follow up\par Denies fever, chills, LE pain/edema\par \par she noticed that she has an animation deformity on both sides of her breast \par

## 2022-07-15 NOTE — HISTORY OF PRESENT ILLNESS
Where Is Your Acne Located?: Face [FreeTextEntry1] : Ms. 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 Pt is now s/p b/l breast implant removal, capsulectomy, mastopexy, and alloderm placement 1/28/22\par pt presents today for routine follow up\par Denies fever, chills, LE pain/edema\par pt states over the weekend she had an episode of severe night sweats, which the next day lead to a period of time where she felt faint in the shower, she states for a few days she had a mild headache and just felt "off"\par She was sent for blood work on 2/19/22 which came back normal aside from her elevated platelets as was prior to surgery\par pt states today she feels better\par pt states all her prenio is off

## 2023-03-14 ENCOUNTER — APPOINTMENT (OUTPATIENT)
Dept: PLASTIC SURGERY | Facility: CLINIC | Age: 58
End: 2023-03-14
Payer: COMMERCIAL

## 2023-03-14 VITALS
HEIGHT: 63 IN | WEIGHT: 119 LBS | BODY MASS INDEX: 21.09 KG/M2 | OXYGEN SATURATION: 97 % | TEMPERATURE: 97.2 F | DIASTOLIC BLOOD PRESSURE: 83 MMHG | RESPIRATION RATE: 14 BRPM | HEART RATE: 80 BPM | SYSTOLIC BLOOD PRESSURE: 135 MMHG

## 2023-03-14 PROCEDURE — 99214 OFFICE O/P EST MOD 30 MIN: CPT

## 2023-04-19 NOTE — PHYSICAL EXAM
[NI] : Normal [de-identified] : b/l breasts are soft and symmetrical\par incisions well healed\par nipples viable\par no palpable fluid collections or signs of infection\par there is an area of medially when patient bends over with contour irregularity \par she has b/l severe animation deformity

## 2023-04-19 NOTE — HISTORY OF PRESENT ILLNESS
[FreeTextEntry3] : This note was written by Tasia Cotter on 07/20/2021 acting as scribe for Dr. Hebert.  [FreeTextEntry1] : Ms. 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 Pt is s/p b/l breast implant removal, capsulectomy, mastopexy, and alloderm placement 1/28/22\par At her last visit 6/28/22 she expressed her concern about an animation deformity of her b/l breasts.  At the time she wanted to pursue scar massages and she wanted to wait 1 year from her surgery.  She presents today to discuss continued animation deformity which she finds uncomfortable.  She notices the deformity when ever she moves her arms and has not had any relief with previous treatments\par \par

## 2023-04-19 NOTE — ASSESSMENT
[FreeTextEntry1] : 56 yo female s/p b/l breast implant removal with subsequent animation deformity b/l \par I discussed the risks, benefits, and alternatives including the risks of infection, bleeding, seroma, hematoma, asymmetry, nipple necrosis, change/loss of nipple sensation, contour irregularity, breast skin necrosis, delayed wound healing, need for more surgery.  The patient understands these risks, all questions were answered and the pt wishes to proceed with surgery.\par Discussed revision at todays visit including fat grafting to b/l breasts, possible alloderm placement inbetween muscle and breast tissue, botox injection into pectoralis muscle, and scar revision.

## 2023-05-11 NOTE — H&P PST ADULT - EKG AND INTERPRETATION
Epidermal Closure: running and interrupted Normal sinus rhythm 77 bpm, no acute change. EKG pending official reading

## 2023-06-06 ENCOUNTER — OFFICE (OUTPATIENT)
Dept: URBAN - METROPOLITAN AREA CLINIC 104 | Facility: CLINIC | Age: 58
Setting detail: OPHTHALMOLOGY
End: 2023-06-06
Payer: COMMERCIAL

## 2023-06-06 DIAGNOSIS — H11.441: ICD-10-CM

## 2023-06-06 PROCEDURE — 92002 INTRM OPH EXAM NEW PATIENT: CPT | Performed by: OPTOMETRIST

## 2023-06-06 ASSESSMENT — VISUAL ACUITY
OS_BCVA: 20/200
OD_BCVA: 20/25-1

## 2023-06-06 ASSESSMENT — TONOMETRY
OD_IOP_MMHG: 18
OS_IOP_MMHG: 18

## 2023-06-06 ASSESSMENT — LID EXAM ASSESSMENTS
OS_BLEPHARITIS: LLL LUL 1+
OD_BLEPHARITIS: RLL RUL 1+

## 2023-06-06 ASSESSMENT — CONFRONTATIONAL VISUAL FIELD TEST (CVF)
OS_FINDINGS: FULL
OD_FINDINGS: FULL

## 2023-06-06 ASSESSMENT — REFRACTION_CURRENTRX
OD_OVR_VA: 20/
OS_OVR_VA: 20/

## 2023-08-29 ENCOUNTER — OUTPATIENT (OUTPATIENT)
Dept: OUTPATIENT SERVICES | Facility: HOSPITAL | Age: 58
LOS: 1 days | End: 2023-08-29
Payer: COMMERCIAL

## 2023-08-29 ENCOUNTER — RESULT REVIEW (OUTPATIENT)
Age: 58
End: 2023-08-29

## 2023-08-29 ENCOUNTER — TRANSCRIPTION ENCOUNTER (OUTPATIENT)
Age: 58
End: 2023-08-29

## 2023-08-29 VITALS
SYSTOLIC BLOOD PRESSURE: 151 MMHG | TEMPERATURE: 98 F | OXYGEN SATURATION: 100 % | HEIGHT: 63 IN | WEIGHT: 115.96 LBS | DIASTOLIC BLOOD PRESSURE: 73 MMHG | RESPIRATION RATE: 18 BRPM | HEART RATE: 75 BPM

## 2023-08-29 DIAGNOSIS — Z98.86 PERSONAL HISTORY OF BREAST IMPLANT REMOVAL: Chronic | ICD-10-CM

## 2023-08-29 DIAGNOSIS — Z90.89 ACQUIRED ABSENCE OF OTHER ORGANS: Chronic | ICD-10-CM

## 2023-08-29 DIAGNOSIS — Z98.890 OTHER SPECIFIED POSTPROCEDURAL STATES: Chronic | ICD-10-CM

## 2023-08-29 DIAGNOSIS — Z01.818 ENCOUNTER FOR OTHER PREPROCEDURAL EXAMINATION: ICD-10-CM

## 2023-08-29 DIAGNOSIS — Z98.82 BREAST IMPLANT STATUS: Chronic | ICD-10-CM

## 2023-08-29 DIAGNOSIS — Z98.891 HISTORY OF UTERINE SCAR FROM PREVIOUS SURGERY: Chronic | ICD-10-CM

## 2023-08-29 LAB
ANION GAP SERPL CALC-SCNC: 3 MMOL/L — LOW (ref 5–17)
APPEARANCE UR: CLEAR — SIGNIFICANT CHANGE UP
BACTERIA # UR AUTO: NEGATIVE — SIGNIFICANT CHANGE UP
BASOPHILS # BLD AUTO: 0.04 K/UL — SIGNIFICANT CHANGE UP (ref 0–0.2)
BASOPHILS NFR BLD AUTO: 0.5 % — SIGNIFICANT CHANGE UP (ref 0–2)
BILIRUB UR-MCNC: NEGATIVE — SIGNIFICANT CHANGE UP
BLD GP AB SCN SERPL QL: SIGNIFICANT CHANGE UP
BUN SERPL-MCNC: 16 MG/DL — SIGNIFICANT CHANGE UP (ref 7–23)
CALCIUM SERPL-MCNC: 9.5 MG/DL — SIGNIFICANT CHANGE UP (ref 8.5–10.1)
CHLORIDE SERPL-SCNC: 106 MMOL/L — SIGNIFICANT CHANGE UP (ref 96–108)
CO2 SERPL-SCNC: 28 MMOL/L — SIGNIFICANT CHANGE UP (ref 22–31)
COLOR SPEC: YELLOW — SIGNIFICANT CHANGE UP
CREAT SERPL-MCNC: 0.72 MG/DL — SIGNIFICANT CHANGE UP (ref 0.5–1.3)
DIFF PNL FLD: ABNORMAL
EGFR: 96 ML/MIN/1.73M2 — SIGNIFICANT CHANGE UP
EOSINOPHIL # BLD AUTO: 0.01 K/UL — SIGNIFICANT CHANGE UP (ref 0–0.5)
EOSINOPHIL NFR BLD AUTO: 0.1 % — SIGNIFICANT CHANGE UP (ref 0–6)
EPI CELLS # UR: NEGATIVE — SIGNIFICANT CHANGE UP
GLUCOSE SERPL-MCNC: 89 MG/DL — SIGNIFICANT CHANGE UP (ref 70–99)
GLUCOSE UR QL: NEGATIVE — SIGNIFICANT CHANGE UP
HCT VFR BLD CALC: 42.2 % — SIGNIFICANT CHANGE UP (ref 34.5–45)
HGB BLD-MCNC: 14.1 G/DL — SIGNIFICANT CHANGE UP (ref 11.5–15.5)
IMM GRANULOCYTES NFR BLD AUTO: 0.2 % — SIGNIFICANT CHANGE UP (ref 0–0.9)
INR BLD: 0.95 RATIO — SIGNIFICANT CHANGE UP (ref 0.85–1.18)
KETONES UR-MCNC: NEGATIVE — SIGNIFICANT CHANGE UP
LEUKOCYTE ESTERASE UR-ACNC: NEGATIVE — SIGNIFICANT CHANGE UP
LYMPHOCYTES # BLD AUTO: 2.54 K/UL — SIGNIFICANT CHANGE UP (ref 1–3.3)
LYMPHOCYTES # BLD AUTO: 31.2 % — SIGNIFICANT CHANGE UP (ref 13–44)
MCHC RBC-ENTMCNC: 31.9 PG — SIGNIFICANT CHANGE UP (ref 27–34)
MCHC RBC-ENTMCNC: 33.4 GM/DL — SIGNIFICANT CHANGE UP (ref 32–36)
MCV RBC AUTO: 95.5 FL — SIGNIFICANT CHANGE UP (ref 80–100)
MONOCYTES # BLD AUTO: 0.62 K/UL — SIGNIFICANT CHANGE UP (ref 0–0.9)
MONOCYTES NFR BLD AUTO: 7.6 % — SIGNIFICANT CHANGE UP (ref 2–14)
MRSA PCR RESULT.: SIGNIFICANT CHANGE UP
NEUTROPHILS # BLD AUTO: 4.91 K/UL — SIGNIFICANT CHANGE UP (ref 1.8–7.4)
NEUTROPHILS NFR BLD AUTO: 60.4 % — SIGNIFICANT CHANGE UP (ref 43–77)
NITRITE UR-MCNC: NEGATIVE — SIGNIFICANT CHANGE UP
PH UR: 6 — SIGNIFICANT CHANGE UP (ref 5–8)
PLATELET # BLD AUTO: 361 K/UL — SIGNIFICANT CHANGE UP (ref 150–400)
POTASSIUM SERPL-MCNC: 5 MMOL/L — SIGNIFICANT CHANGE UP (ref 3.5–5.3)
POTASSIUM SERPL-SCNC: 5 MMOL/L — SIGNIFICANT CHANGE UP (ref 3.5–5.3)
PROT UR-MCNC: NEGATIVE — SIGNIFICANT CHANGE UP
PROTHROM AB SERPL-ACNC: 10.7 SEC — SIGNIFICANT CHANGE UP (ref 9.5–13)
RBC # BLD: 4.42 M/UL — SIGNIFICANT CHANGE UP (ref 3.8–5.2)
RBC # FLD: 12.1 % — SIGNIFICANT CHANGE UP (ref 10.3–14.5)
RBC CASTS # UR COMP ASSIST: NEGATIVE /HPF — SIGNIFICANT CHANGE UP (ref 0–4)
S AUREUS DNA NOSE QL NAA+PROBE: DETECTED
SODIUM SERPL-SCNC: 137 MMOL/L — SIGNIFICANT CHANGE UP (ref 135–145)
SP GR SPEC: 1 — LOW (ref 1.01–1.02)
UROBILINOGEN FLD QL: NEGATIVE — SIGNIFICANT CHANGE UP
WBC # BLD: 8.14 K/UL — SIGNIFICANT CHANGE UP (ref 3.8–10.5)
WBC # FLD AUTO: 8.14 K/UL — SIGNIFICANT CHANGE UP (ref 3.8–10.5)
WBC UR QL: NEGATIVE /HPF — SIGNIFICANT CHANGE UP (ref 0–5)

## 2023-08-29 PROCEDURE — 36415 COLL VENOUS BLD VENIPUNCTURE: CPT

## 2023-08-29 PROCEDURE — 93010 ELECTROCARDIOGRAM REPORT: CPT

## 2023-08-29 PROCEDURE — 81001 URINALYSIS AUTO W/SCOPE: CPT

## 2023-08-29 PROCEDURE — 86901 BLOOD TYPING SEROLOGIC RH(D): CPT

## 2023-08-29 PROCEDURE — 86850 RBC ANTIBODY SCREEN: CPT

## 2023-08-29 PROCEDURE — 93005 ELECTROCARDIOGRAM TRACING: CPT

## 2023-08-29 PROCEDURE — 71046 X-RAY EXAM CHEST 2 VIEWS: CPT | Mod: 26

## 2023-08-29 PROCEDURE — 87640 STAPH A DNA AMP PROBE: CPT

## 2023-08-29 PROCEDURE — 80048 BASIC METABOLIC PNL TOTAL CA: CPT

## 2023-08-29 PROCEDURE — 86900 BLOOD TYPING SEROLOGIC ABO: CPT

## 2023-08-29 PROCEDURE — 85610 PROTHROMBIN TIME: CPT

## 2023-08-29 PROCEDURE — 99214 OFFICE O/P EST MOD 30 MIN: CPT | Mod: 25

## 2023-08-29 PROCEDURE — 71046 X-RAY EXAM CHEST 2 VIEWS: CPT

## 2023-08-29 PROCEDURE — 85025 COMPLETE CBC W/AUTO DIFF WBC: CPT

## 2023-08-29 PROCEDURE — 87641 MR-STAPH DNA AMP PROBE: CPT

## 2023-08-29 NOTE — H&P PST ADULT - NSICDXPASTMEDICALHX_GEN_ALL_CORE_FT
PAST MEDICAL HISTORY:  Breast implant status 2007    Capsular contracture of breast implant, initial encounter     Pain from breast implant      PAST MEDICAL HISTORY:  Breast implant removal status     Capsular contracture of breast implant, initial encounter     Pain from breast implant     Varicosities of leg

## 2023-08-29 NOTE — H&P PST ADULT - ASSESSMENT
58 y/o female with history of bilateral breast augmentation, S/P bilateral breast implant removal in 2022. Pt reports left breast muscle pain and unsightly breast scars. She is scheduled for Fat grafting to bilateral breast, possible alloderm placement in between muscle and breast tissue, botox injection to pectoralis muscle, scar revision.  Plan  1. Stop all NSAIDS, herbal supplements and vitamins for 7 days.  2. NPO as per ASU instructions  3. Use EZ sponges as directed  4. Use mupirocin as directed  5. Labs, EKG, CXR as per surgeon.  6. PMD visit for optimization prior to surgery as per surgeon.

## 2023-08-29 NOTE — H&P PST ADULT - HISTORY OF PRESENT ILLNESS
Pt 57 y/o female seen today pre-op Removal of bilateral implants,  capsulectomy, muscle repair. Pt has a history of bilateral breast augmentation since 2007, endorsed she has developed bilateral breast pain associated with periprosthetic scarring. Pt report left breast pain is worse than her right breast, difficulty with lifting due to breast pain.  Berkley breast discharge, denies discoloration,  denies nipple skin changes, denies nipple retraction, denies mass. Pt seen today for a scheduled surgery on 1/28/22 with Dr. Owen.    56 y/o female with history of bilateral breast augmentation, S/P bilateral breast implant removal in 2022. Pt reports left breast muscle pain and unsightly breast scars. She is here for PST for planned Fat grafting to bilateral breast, possible alloderm placement in between muscle and breast tissue, botox injection to pectoralis muscle, scar revision.

## 2023-08-29 NOTE — H&P PST ADULT - NSICDXPASTSURGICALHX_GEN_ALL_CORE_FT
PAST SURGICAL HISTORY:  H/O breast augmentation     History of  x3     PAST SURGICAL HISTORY:  H/O breast augmentation     History of  x3    S/p breast implant removal     S/P dilation and curettage     S/P tonsillectomy     S/P vein stripping

## 2023-08-29 NOTE — H&P PST ADULT - GASTROINTESTINAL
negative audio/skill demonstration/written material/pictorial/verbal instruction normal/soft/nontender/nondistended/normal active bowel sounds

## 2023-08-30 DIAGNOSIS — Z01.818 ENCOUNTER FOR OTHER PREPROCEDURAL EXAMINATION: ICD-10-CM

## 2023-08-30 NOTE — CHART NOTE - NSCHARTNOTEFT_GEN_A_CORE
MSSA detected from nasal swab done in PST on 8/29/2023. Patient instructed to apply Mupirocin to nostrils 2 times per day for 5 days starting 9/1/2023. Patient was able to verbalize instructions. Questions answered.

## 2023-08-31 PROBLEM — I83.90 ASYMPTOMATIC VARICOSE VEINS OF UNSPECIFIED LOWER EXTREMITY: Chronic | Status: ACTIVE | Noted: 2023-08-29

## 2023-08-31 PROBLEM — Z98.86 PERSONAL HISTORY OF BREAST IMPLANT REMOVAL: Chronic | Status: ACTIVE | Noted: 2023-08-29

## 2023-09-05 ENCOUNTER — APPOINTMENT (OUTPATIENT)
Dept: PLASTIC SURGERY | Facility: CLINIC | Age: 58
End: 2023-09-05
Payer: COMMERCIAL

## 2023-09-05 VITALS
HEART RATE: 78 BPM | BODY MASS INDEX: 20.2 KG/M2 | TEMPERATURE: 97.4 F | HEIGHT: 63 IN | WEIGHT: 114 LBS | RESPIRATION RATE: 14 BRPM | SYSTOLIC BLOOD PRESSURE: 127 MMHG | OXYGEN SATURATION: 98 % | DIASTOLIC BLOOD PRESSURE: 69 MMHG

## 2023-09-05 PROCEDURE — 99214 OFFICE O/P EST MOD 30 MIN: CPT

## 2023-09-05 NOTE — HISTORY OF PRESENT ILLNESS
[FreeTextEntry1] : Ms. JAROD DANIEL is a 57 year old female who has a history of bilateral breast augmentation, who has developed bilateral breast pain associated with periprosthetic scarring. Pt is s/p b/l breast implant removal, capsulectomy, mastopexy, and alloderm placement 1/28/22.   At her last visit 6/28/22 she expressed her concern about an animation deformity of her b/l breasts. At the time she wanted to pursue scar massages and she wanted to wait 1 year from her surgery. She presents today to discuss her procedure tomorrow 9/6 fat grafting to bilateral breasts with possible alloderm placement and scar revision.

## 2023-09-05 NOTE — REASON FOR VISIT
[Follow-Up: _____] : a [unfilled] follow-up visit [FreeTextEntry1] : discuss surgical procedure for tomorrow 9/6

## 2023-09-05 NOTE — PHYSICAL EXAM
[NI] : Normal [de-identified] :  b/l breasts are soft and symmetrical there is an area of medially when patient bends over with contour irregularity she has b/l severe animation deformity

## 2023-09-05 NOTE — ASSESSMENT
[FreeTextEntry1] : 56 yo female s/p b/l breast implant removal with subsequent animation deformity b/l  Patient's last mammogram was in September of last year, and it would be necessary that the patient will need an updated one prior to the procedure. Order was sent in for today and the patient is aware that she will need to set up an appointment prior to her procedure. Discussed the possibility of having to reschedule her surgery in order for her to get her updated mammo and ultrasound completed.   I discussed the risks, benefits, and alternatives including the risks of infection, bleeding, seroma, hematoma, asymmetry, nipple necrosis, change/loss of nipple sensation, contour irregularity, breast skin necrosis, delayed wound healing, need for more surgery. The patient understands these risks, all questions were answered and the pt wishes to proceed with surgery.  Reviewed procedure that is scheduled which involves fat grafting to b/l breasts, possible alloderm placement in-between muscle and breast tissue, botox injection into pectoralis muscle, and scar revision.   All patient's questions were answered and the patient is in agreement.

## 2023-09-06 ENCOUNTER — RESULT REVIEW (OUTPATIENT)
Age: 58
End: 2023-09-06

## 2023-09-06 ENCOUNTER — APPOINTMENT (OUTPATIENT)
Dept: ULTRASOUND IMAGING | Facility: CLINIC | Age: 58
End: 2023-09-06
Payer: COMMERCIAL

## 2023-09-06 ENCOUNTER — OUTPATIENT (OUTPATIENT)
Dept: OUTPATIENT SERVICES | Facility: HOSPITAL | Age: 58
LOS: 1 days | End: 2023-09-06
Payer: COMMERCIAL

## 2023-09-06 ENCOUNTER — APPOINTMENT (OUTPATIENT)
Dept: PLASTIC SURGERY | Facility: HOSPITAL | Age: 58
End: 2023-09-06
Payer: COMMERCIAL

## 2023-09-06 ENCOUNTER — APPOINTMENT (OUTPATIENT)
Dept: MAMMOGRAPHY | Facility: CLINIC | Age: 58
End: 2023-09-06
Payer: COMMERCIAL

## 2023-09-06 DIAGNOSIS — Z98.86 PERSONAL HISTORY OF BREAST IMPLANT REMOVAL: Chronic | ICD-10-CM

## 2023-09-06 DIAGNOSIS — Z90.89 ACQUIRED ABSENCE OF OTHER ORGANS: Chronic | ICD-10-CM

## 2023-09-06 DIAGNOSIS — Z98.82 BREAST IMPLANT STATUS: Chronic | ICD-10-CM

## 2023-09-06 DIAGNOSIS — Z98.890 OTHER SPECIFIED POSTPROCEDURAL STATES: Chronic | ICD-10-CM

## 2023-09-06 DIAGNOSIS — Z98.891 HISTORY OF UTERINE SCAR FROM PREVIOUS SURGERY: Chronic | ICD-10-CM

## 2023-09-06 DIAGNOSIS — Z00.00 ENCOUNTER FOR GENERAL ADULT MEDICAL EXAMINATION WITHOUT ABNORMAL FINDINGS: ICD-10-CM

## 2023-09-06 PROCEDURE — 76641 ULTRASOUND BREAST COMPLETE: CPT | Mod: 26,50

## 2023-09-06 PROCEDURE — 77067 SCR MAMMO BI INCL CAD: CPT | Mod: 26

## 2023-09-06 PROCEDURE — 77063 BREAST TOMOSYNTHESIS BI: CPT | Mod: 26

## 2023-09-06 PROCEDURE — 77067 SCR MAMMO BI INCL CAD: CPT

## 2023-09-06 PROCEDURE — 77063 BREAST TOMOSYNTHESIS BI: CPT

## 2023-09-06 PROCEDURE — 76641 ULTRASOUND BREAST COMPLETE: CPT

## 2023-09-11 ENCOUNTER — TRANSCRIPTION ENCOUNTER (OUTPATIENT)
Age: 58
End: 2023-09-11

## 2023-09-11 ENCOUNTER — OUTPATIENT (OUTPATIENT)
Dept: INPATIENT UNIT | Facility: HOSPITAL | Age: 58
LOS: 1 days | Discharge: HOME | End: 2023-09-11
Payer: COMMERCIAL

## 2023-09-11 ENCOUNTER — APPOINTMENT (OUTPATIENT)
Dept: PLASTIC SURGERY | Facility: HOSPITAL | Age: 58
End: 2023-09-11
Payer: COMMERCIAL

## 2023-09-11 VITALS
TEMPERATURE: 99 F | HEIGHT: 63 IN | RESPIRATION RATE: 15 BRPM | SYSTOLIC BLOOD PRESSURE: 145 MMHG | HEART RATE: 77 BPM | OXYGEN SATURATION: 100 % | WEIGHT: 115.96 LBS | DIASTOLIC BLOOD PRESSURE: 64 MMHG

## 2023-09-11 VITALS
TEMPERATURE: 98 F | RESPIRATION RATE: 16 BRPM | DIASTOLIC BLOOD PRESSURE: 69 MMHG | HEART RATE: 89 BPM | SYSTOLIC BLOOD PRESSURE: 145 MMHG | OXYGEN SATURATION: 100 %

## 2023-09-11 DIAGNOSIS — Z98.86 PERSONAL HISTORY OF BREAST IMPLANT REMOVAL: Chronic | ICD-10-CM

## 2023-09-11 DIAGNOSIS — Z98.890 OTHER SPECIFIED POSTPROCEDURAL STATES: Chronic | ICD-10-CM

## 2023-09-11 DIAGNOSIS — Z90.89 ACQUIRED ABSENCE OF OTHER ORGANS: Chronic | ICD-10-CM

## 2023-09-11 DIAGNOSIS — L90.5 SCAR CONDITIONS AND FIBROSIS OF SKIN: ICD-10-CM

## 2023-09-11 DIAGNOSIS — Z98.82 BREAST IMPLANT STATUS: Chronic | ICD-10-CM

## 2023-09-11 DIAGNOSIS — E04.1 NONTOXIC SINGLE THYROID NODULE: ICD-10-CM

## 2023-09-11 DIAGNOSIS — Z98.891 HISTORY OF UTERINE SCAR FROM PREVIOUS SURGERY: Chronic | ICD-10-CM

## 2023-09-11 DIAGNOSIS — Z98.86 PERSONAL HISTORY OF BREAST IMPLANT REMOVAL: ICD-10-CM

## 2023-09-11 DIAGNOSIS — E78.5 HYPERLIPIDEMIA, UNSPECIFIED: ICD-10-CM

## 2023-09-11 DIAGNOSIS — N65.0 DEFORMITY OF RECONSTRUCTED BREAST: ICD-10-CM

## 2023-09-11 DIAGNOSIS — K21.9 GASTRO-ESOPHAGEAL REFLUX DISEASE WITHOUT ESOPHAGITIS: ICD-10-CM

## 2023-09-11 PROCEDURE — 15772 GRFG AUTOL FAT LIPO EA ADDL: CPT

## 2023-09-11 PROCEDURE — ZZZZZ: CPT

## 2023-09-11 PROCEDURE — 15771 GRFG AUTOL FAT LIPO 50 CC/<: CPT

## 2023-09-11 PROCEDURE — 14001 TIS TRNFR TRUNK 10.1-30SQCM: CPT

## 2023-09-11 RX ORDER — OXYCODONE HYDROCHLORIDE 5 MG/1
10 TABLET ORAL ONCE
Refills: 0 | Status: DISCONTINUED | OUTPATIENT
Start: 2023-09-11 | End: 2023-09-11

## 2023-09-11 RX ORDER — ONABOTULINUMTOXINA 100 UNIT
100 VIAL (EA) INJECTION ONCE
Refills: 0 | Status: DISCONTINUED | OUTPATIENT
Start: 2023-09-11 | End: 2023-09-11

## 2023-09-11 RX ORDER — FENTANYL CITRATE 50 UG/ML
50 INJECTION INTRAVENOUS
Refills: 0 | Status: DISCONTINUED | OUTPATIENT
Start: 2023-09-11 | End: 2023-09-11

## 2023-09-11 RX ORDER — OXYCODONE HYDROCHLORIDE 5 MG/1
5 TABLET ORAL ONCE
Refills: 0 | Status: DISCONTINUED | OUTPATIENT
Start: 2023-09-11 | End: 2023-09-11

## 2023-09-11 RX ORDER — ONDANSETRON 8 MG/1
4 TABLET, FILM COATED ORAL ONCE
Refills: 0 | Status: DISCONTINUED | OUTPATIENT
Start: 2023-09-11 | End: 2023-09-11

## 2023-09-11 RX ORDER — APREPITANT 80 MG/1
40 CAPSULE ORAL ONCE
Refills: 0 | Status: COMPLETED | OUTPATIENT
Start: 2023-09-11 | End: 2023-09-11

## 2023-09-11 RX ORDER — SODIUM CHLORIDE 9 MG/ML
1000 INJECTION, SOLUTION INTRAVENOUS
Refills: 0 | Status: DISCONTINUED | OUTPATIENT
Start: 2023-09-11 | End: 2023-09-11

## 2023-09-11 RX ADMIN — OXYCODONE HYDROCHLORIDE 10 MILLIGRAM(S): 5 TABLET ORAL at 17:15

## 2023-09-11 RX ADMIN — APREPITANT 40 MILLIGRAM(S): 80 CAPSULE ORAL at 13:13

## 2023-09-11 RX ADMIN — FENTANYL CITRATE 50 MICROGRAM(S): 50 INJECTION INTRAVENOUS at 16:44

## 2023-09-11 RX ADMIN — OXYCODONE HYDROCHLORIDE 10 MILLIGRAM(S): 5 TABLET ORAL at 16:45

## 2023-09-11 RX ADMIN — FENTANYL CITRATE 50 MICROGRAM(S): 50 INJECTION INTRAVENOUS at 17:14

## 2023-09-11 NOTE — ASU DISCHARGE PLAN (ADULT/PEDIATRIC) - PROCEDURE
Fat grafting to bilateral breasts, possible alloderm placement and possible botox to pec muscle with scar revision

## 2023-09-11 NOTE — ASU DISCHARGE PLAN (ADULT/PEDIATRIC) - CARE PROVIDER_API CALL
Ronan wOen  Plastic Surgery  [address] [place] [id number]  Phone: [phone]  Fax: [phone]  Follow Up Time:

## 2023-09-11 NOTE — ASU DISCHARGE PLAN (ADULT/PEDIATRIC) - NS MD DC FALL RISK RISK
For information on Fall & Injury Prevention, visit: [email or web address] OR  [email or web address] OR  https://www.cdc.gov/steadi/patient.html

## 2023-09-11 NOTE — ASU PATIENT PROFILE, ADULT - FALL HARM RISK - UNIVERSAL INTERVENTIONS
Bed in lowest position, wheels locked, appropriate side rails in place/Call bell, personal items and telephone in reach/Instruct patient to call for assistance before getting out of bed or chair/Non-slip footwear when patient is out of bed/[place] to call system/Physically safe environment - no spills, clutter or unnecessary equipment/Purposeful Proactive Rounding/Room/bathroom lighting operational, light cord in reach

## 2023-09-11 NOTE — ASU PATIENT PROFILE, ADULT - NSICDXPASTMEDICALHX_GEN_ALL_CORE_FT
PAST MEDICAL HISTORY:  Breast implant removal status     Capsular contracture of breast implant, initial encounter     Pain from breast implant     Varicosities of leg

## 2023-09-11 NOTE — ASU PATIENT PROFILE, ADULT - NSICDXPASTSURGICALHX_GEN_ALL_CORE_FT
PAST SURGICAL HISTORY:  H/O breast augmentation     History of  x3    S/p breast implant removal     S/P dilation and curettage     S/P tonsillectomy     S/P vein stripping

## 2023-09-19 ENCOUNTER — APPOINTMENT (OUTPATIENT)
Dept: PLASTIC SURGERY | Facility: CLINIC | Age: 58
End: 2023-09-19
Payer: COMMERCIAL

## 2023-09-19 VITALS
TEMPERATURE: 97.3 F | DIASTOLIC BLOOD PRESSURE: 78 MMHG | HEIGHT: 63 IN | BODY MASS INDEX: 21.09 KG/M2 | HEART RATE: 78 BPM | OXYGEN SATURATION: 98 % | WEIGHT: 119 LBS | RESPIRATION RATE: 14 BRPM | SYSTOLIC BLOOD PRESSURE: 131 MMHG

## 2023-09-19 PROCEDURE — 99024 POSTOP FOLLOW-UP VISIT: CPT

## 2023-10-03 ENCOUNTER — APPOINTMENT (OUTPATIENT)
Dept: PLASTIC SURGERY | Facility: CLINIC | Age: 58
End: 2023-10-03
Payer: COMMERCIAL

## 2023-10-03 VITALS
DIASTOLIC BLOOD PRESSURE: 77 MMHG | OXYGEN SATURATION: 99 % | WEIGHT: 115 LBS | SYSTOLIC BLOOD PRESSURE: 134 MMHG | HEART RATE: 75 BPM | BODY MASS INDEX: 20.38 KG/M2 | HEIGHT: 63 IN | RESPIRATION RATE: 14 BRPM | TEMPERATURE: 97.2 F

## 2023-10-03 PROCEDURE — 99024 POSTOP FOLLOW-UP VISIT: CPT

## 2023-10-24 ENCOUNTER — APPOINTMENT (OUTPATIENT)
Dept: PLASTIC SURGERY | Facility: CLINIC | Age: 58
End: 2023-10-24

## 2023-11-07 ENCOUNTER — APPOINTMENT (OUTPATIENT)
Dept: PLASTIC SURGERY | Facility: CLINIC | Age: 58
End: 2023-11-07
Payer: COMMERCIAL

## 2023-11-07 VITALS
WEIGHT: 116 LBS | SYSTOLIC BLOOD PRESSURE: 137 MMHG | OXYGEN SATURATION: 100 % | RESPIRATION RATE: 14 BRPM | HEART RATE: 74 BPM | HEIGHT: 63 IN | DIASTOLIC BLOOD PRESSURE: 82 MMHG | BODY MASS INDEX: 20.55 KG/M2

## 2023-11-07 PROCEDURE — 99024 POSTOP FOLLOW-UP VISIT: CPT

## 2024-02-20 ENCOUNTER — OUTPATIENT (OUTPATIENT)
Dept: OUTPATIENT SERVICES | Facility: HOSPITAL | Age: 59
LOS: 1 days | End: 2024-02-20
Payer: COMMERCIAL

## 2024-02-20 VITALS
SYSTOLIC BLOOD PRESSURE: 144 MMHG | RESPIRATION RATE: 16 BRPM | OXYGEN SATURATION: 100 % | HEART RATE: 82 BPM | HEIGHT: 63 IN | WEIGHT: 121.03 LBS | TEMPERATURE: 99 F | DIASTOLIC BLOOD PRESSURE: 69 MMHG

## 2024-02-20 DIAGNOSIS — Z98.890 OTHER SPECIFIED POSTPROCEDURAL STATES: Chronic | ICD-10-CM

## 2024-02-20 DIAGNOSIS — Z98.82 BREAST IMPLANT STATUS: Chronic | ICD-10-CM

## 2024-02-20 DIAGNOSIS — Z98.86 PERSONAL HISTORY OF BREAST IMPLANT REMOVAL: Chronic | ICD-10-CM

## 2024-02-20 DIAGNOSIS — Z98.891 HISTORY OF UTERINE SCAR FROM PREVIOUS SURGERY: Chronic | ICD-10-CM

## 2024-02-20 DIAGNOSIS — Z90.89 ACQUIRED ABSENCE OF OTHER ORGANS: Chronic | ICD-10-CM

## 2024-02-20 DIAGNOSIS — Z01.818 ENCOUNTER FOR OTHER PREPROCEDURAL EXAMINATION: ICD-10-CM

## 2024-02-20 LAB
ANION GAP SERPL CALC-SCNC: 3 MMOL/L — LOW (ref 5–17)
APPEARANCE UR: CLEAR — SIGNIFICANT CHANGE UP
BACTERIA # UR AUTO: NEGATIVE /HPF — SIGNIFICANT CHANGE UP
BASOPHILS # BLD AUTO: 0.03 K/UL — SIGNIFICANT CHANGE UP (ref 0–0.2)
BASOPHILS NFR BLD AUTO: 0.3 % — SIGNIFICANT CHANGE UP (ref 0–2)
BILIRUB UR-MCNC: NEGATIVE — SIGNIFICANT CHANGE UP
BLD GP AB SCN SERPL QL: SIGNIFICANT CHANGE UP
BUN SERPL-MCNC: 12 MG/DL — SIGNIFICANT CHANGE UP (ref 7–23)
CALCIUM SERPL-MCNC: 9.5 MG/DL — SIGNIFICANT CHANGE UP (ref 8.5–10.1)
CAST: 0 /LPF — SIGNIFICANT CHANGE UP (ref 0–4)
CHLORIDE SERPL-SCNC: 106 MMOL/L — SIGNIFICANT CHANGE UP (ref 96–108)
CO2 SERPL-SCNC: 29 MMOL/L — SIGNIFICANT CHANGE UP (ref 22–31)
COLOR SPEC: YELLOW — SIGNIFICANT CHANGE UP
CREAT SERPL-MCNC: 0.64 MG/DL — SIGNIFICANT CHANGE UP (ref 0.5–1.3)
DIFF PNL FLD: NEGATIVE — SIGNIFICANT CHANGE UP
EGFR: 101 ML/MIN/1.73M2 — SIGNIFICANT CHANGE UP
EOSINOPHIL # BLD AUTO: 0.01 K/UL — SIGNIFICANT CHANGE UP (ref 0–0.5)
EOSINOPHIL NFR BLD AUTO: 0.1 % — SIGNIFICANT CHANGE UP (ref 0–6)
GLUCOSE SERPL-MCNC: 104 MG/DL — HIGH (ref 70–99)
GLUCOSE UR QL: NEGATIVE MG/DL — SIGNIFICANT CHANGE UP
HCT VFR BLD CALC: 39.7 % — SIGNIFICANT CHANGE UP (ref 34.5–45)
HGB BLD-MCNC: 13 G/DL — SIGNIFICANT CHANGE UP (ref 11.5–15.5)
IMM GRANULOCYTES NFR BLD AUTO: 0.2 % — SIGNIFICANT CHANGE UP (ref 0–0.9)
INR BLD: 0.97 RATIO — SIGNIFICANT CHANGE UP (ref 0.85–1.18)
KETONES UR-MCNC: NEGATIVE MG/DL — SIGNIFICANT CHANGE UP
LEUKOCYTE ESTERASE UR-ACNC: ABNORMAL
LYMPHOCYTES # BLD AUTO: 2.77 K/UL — SIGNIFICANT CHANGE UP (ref 1–3.3)
LYMPHOCYTES # BLD AUTO: 30.6 % — SIGNIFICANT CHANGE UP (ref 13–44)
MCHC RBC-ENTMCNC: 31.1 PG — SIGNIFICANT CHANGE UP (ref 27–34)
MCHC RBC-ENTMCNC: 32.7 GM/DL — SIGNIFICANT CHANGE UP (ref 32–36)
MCV RBC AUTO: 95 FL — SIGNIFICANT CHANGE UP (ref 80–100)
MONOCYTES # BLD AUTO: 0.55 K/UL — SIGNIFICANT CHANGE UP (ref 0–0.9)
MONOCYTES NFR BLD AUTO: 6.1 % — SIGNIFICANT CHANGE UP (ref 2–14)
NEUTROPHILS # BLD AUTO: 5.68 K/UL — SIGNIFICANT CHANGE UP (ref 1.8–7.4)
NEUTROPHILS NFR BLD AUTO: 62.7 % — SIGNIFICANT CHANGE UP (ref 43–77)
NITRITE UR-MCNC: NEGATIVE — SIGNIFICANT CHANGE UP
PH UR: 6 — SIGNIFICANT CHANGE UP (ref 5–8)
PLATELET # BLD AUTO: 440 K/UL — HIGH (ref 150–400)
POTASSIUM SERPL-MCNC: 4.4 MMOL/L — SIGNIFICANT CHANGE UP (ref 3.5–5.3)
POTASSIUM SERPL-SCNC: 4.4 MMOL/L — SIGNIFICANT CHANGE UP (ref 3.5–5.3)
PROT UR-MCNC: NEGATIVE MG/DL — SIGNIFICANT CHANGE UP
PROTHROM AB SERPL-ACNC: 11 SEC — SIGNIFICANT CHANGE UP (ref 9.5–13)
RBC # BLD: 4.18 M/UL — SIGNIFICANT CHANGE UP (ref 3.8–5.2)
RBC # FLD: 12.6 % — SIGNIFICANT CHANGE UP (ref 10.3–14.5)
RBC CASTS # UR COMP ASSIST: 3 /HPF — SIGNIFICANT CHANGE UP (ref 0–4)
SODIUM SERPL-SCNC: 138 MMOL/L — SIGNIFICANT CHANGE UP (ref 135–145)
SP GR SPEC: 1.01 — SIGNIFICANT CHANGE UP (ref 1–1.03)
SQUAMOUS # UR AUTO: 1 /HPF — SIGNIFICANT CHANGE UP (ref 0–5)
UROBILINOGEN FLD QL: 0.2 MG/DL — SIGNIFICANT CHANGE UP (ref 0.2–1)
WBC # BLD: 9.06 K/UL — SIGNIFICANT CHANGE UP (ref 3.8–10.5)
WBC # FLD AUTO: 9.06 K/UL — SIGNIFICANT CHANGE UP (ref 3.8–10.5)
WBC UR QL: 2 /HPF — SIGNIFICANT CHANGE UP (ref 0–5)

## 2024-02-20 PROCEDURE — 86900 BLOOD TYPING SEROLOGIC ABO: CPT

## 2024-02-20 PROCEDURE — 85025 COMPLETE CBC W/AUTO DIFF WBC: CPT

## 2024-02-20 PROCEDURE — 81001 URINALYSIS AUTO W/SCOPE: CPT

## 2024-02-20 PROCEDURE — 86850 RBC ANTIBODY SCREEN: CPT

## 2024-02-20 PROCEDURE — 99214 OFFICE O/P EST MOD 30 MIN: CPT | Mod: 25

## 2024-02-20 PROCEDURE — 80048 BASIC METABOLIC PNL TOTAL CA: CPT

## 2024-02-20 PROCEDURE — 86901 BLOOD TYPING SEROLOGIC RH(D): CPT

## 2024-02-20 PROCEDURE — 36415 COLL VENOUS BLD VENIPUNCTURE: CPT

## 2024-02-20 PROCEDURE — 85610 PROTHROMBIN TIME: CPT

## 2024-02-20 NOTE — H&P PST ADULT - NSICDXPASTMEDICALHX_GEN_ALL_CORE_FT
PAST MEDICAL HISTORY:  Breast implant removal status     Capsular contracture of breast implant, initial encounter     Joint pain     Pain from breast implant     Varicosities of leg

## 2024-02-20 NOTE — H&P PST ADULT - NSICDXPASTSURGICALHX_GEN_ALL_CORE_FT
PAST SURGICAL HISTORY:  H/O breast augmentation     H/O breast reconstruction     History of  x3    S/p breast implant removal     S/P dilation and curettage     S/P tonsillectomy     S/P vein stripping

## 2024-02-20 NOTE — H&P PST ADULT - BMI (KG/M2)
10-12 Na134 mmol/L<L> Glu 108 mg/dL<H> K+ 4.5 mmol/L Cr  6.51 mg/dL<H>  mg/dL<H> Phos 7.7 mg/dL<H> Alb n/a   PAB n/a       10-12    134<L>  |  104  |  106<H>  ----------------------------<  108<H>  4.5   |  15<L>  |  6.51<H>    Ca    7.0<L>      12 Oct 2021 09:54  Phos  7.7     10-12    TPro  7.4  /  Alb  2.5<L>  /  TBili  0.2  /  DBili  x   /  AST  23  /  ALT  13  /  AlkPhos  60  10-11 21.4

## 2024-02-20 NOTE — H&P PST ADULT - NSICDXFAMILYHX_GEN_ALL_CORE_FT
FAMILY HISTORY:  Father  Still living? Yes, Estimated age: Age Unknown  FHx: atrial fibrillation, Age at diagnosis: Age Unknown    Mother  Still living? Yes, Estimated age: Age Unknown  Family history of TIAs, Age at diagnosis: Age Unknown    Sibling  Still living? Yes, Estimated age: 51-60  FH: type 2 diabetes, Age at diagnosis: Age Unknown

## 2024-02-20 NOTE — H&P PST ADULT - HISTORY OF PRESENT ILLNESS
58 years old female s/p breast implant removal. Patient with implant 1/28/2022. August 2023. Revision done.  Denies pain and discomfort. Some tightness to interior lateral of breast. Worst to the left. Planned bilateral revision of reconstructed breast.

## 2024-02-20 NOTE — H&P PST ADULT - ASSESSMENT
58 years old female present to PST prior to bilateral revision of reconstructed breasts including fat grafting with Dr. Owen.    Patient denies signs and symptoms of Covid 19 such as shortness of breath/ difficulty breathing, fever/chills, cough, muscle /body ache, headache, loss of taste or smell.    Plan   1. Education and Instructions given to patient regarding upcoming surgery  2. NPO as per ASU  3. Take the following medications with sips of water on the day of procedure: None  4. Use E-Z sponge as directed  5. Drink a quart of extra  fluids the day before your surgery.  6. Medical optimization for surgery with Dr. Hernandez  7. CBC, BMP,  PT/ INR , Urinalysis, Type and Screen done in PST and sent to lab  8. EKG and Chest x- ray on chart from 8/ 2023  9. Do not take NSAIDS, Aspirin, Herbal supplements, Multivitamins, Vitamin E or Fish oil 7 days prior to surgery

## 2024-02-21 DIAGNOSIS — Z01.818 ENCOUNTER FOR OTHER PREPROCEDURAL EXAMINATION: ICD-10-CM

## 2024-02-22 RX ORDER — OXYCODONE 5 MG/1
5 TABLET ORAL
Qty: 10 | Refills: 0 | Status: ACTIVE | COMMUNITY
Start: 2024-02-22 | End: 1900-01-01

## 2024-02-22 RX ORDER — CEFADROXIL 500 MG/1
500 CAPSULE ORAL TWICE DAILY
Qty: 6 | Refills: 0 | Status: ACTIVE | COMMUNITY
Start: 2024-02-22 | End: 1900-01-01

## 2024-02-23 PROBLEM — M25.50 PAIN IN UNSPECIFIED JOINT: Chronic | Status: ACTIVE | Noted: 2024-02-20

## 2024-02-23 RX ORDER — OXYCODONE 5 MG/1
5 TABLET ORAL
Qty: 10 | Refills: 0 | Status: DISCONTINUED | COMMUNITY
Start: 2023-09-07 | End: 2024-02-23

## 2024-02-23 RX ORDER — OXYCODONE 5 MG/1
5 TABLET ORAL
Qty: 10 | Refills: 0 | Status: DISCONTINUED | COMMUNITY
Start: 2022-01-20 | End: 2024-02-23

## 2024-02-23 RX ORDER — TRAMADOL HYDROCHLORIDE 50 MG/1
50 TABLET, COATED ORAL
Qty: 12 | Refills: 0 | Status: DISCONTINUED | COMMUNITY
Start: 2022-03-04 | End: 2024-02-23

## 2024-02-23 RX ORDER — CEFADROXIL 500 MG/1
500 CAPSULE ORAL TWICE DAILY
Qty: 6 | Refills: 0 | Status: DISCONTINUED | COMMUNITY
Start: 2023-09-07 | End: 2024-02-23

## 2024-02-23 RX ORDER — OXYCODONE 5 MG/1
5 TABLET ORAL
Qty: 10 | Refills: 0 | Status: DISCONTINUED | COMMUNITY
Start: 2023-08-30 | End: 2024-02-23

## 2024-02-23 RX ORDER — CEFADROXIL 500 MG/1
500 CAPSULE ORAL TWICE DAILY
Qty: 6 | Refills: 0 | Status: DISCONTINUED | COMMUNITY
Start: 2023-08-30 | End: 2024-02-23

## 2024-02-23 RX ORDER — OXYCODONE 5 MG/1
5 TABLET ORAL
Qty: 28 | Refills: 0 | Status: DISCONTINUED | COMMUNITY
Start: 2022-01-30 | End: 2024-02-23

## 2024-02-23 RX ORDER — CEFADROXIL 500 MG/1
500 CAPSULE ORAL
Qty: 6 | Refills: 0 | Status: DISCONTINUED | COMMUNITY
Start: 2022-01-28 | End: 2024-02-23

## 2024-02-28 ENCOUNTER — NON-APPOINTMENT (OUTPATIENT)
Age: 59
End: 2024-02-28

## 2024-02-28 ENCOUNTER — TRANSCRIPTION ENCOUNTER (OUTPATIENT)
Age: 59
End: 2024-02-28

## 2024-02-28 ENCOUNTER — APPOINTMENT (OUTPATIENT)
Dept: PLASTIC SURGERY | Facility: HOSPITAL | Age: 59
End: 2024-02-28
Payer: COMMERCIAL

## 2024-02-28 ENCOUNTER — OUTPATIENT (OUTPATIENT)
Dept: INPATIENT UNIT | Facility: HOSPITAL | Age: 59
LOS: 1 days | Discharge: HOME | End: 2024-02-28
Payer: COMMERCIAL

## 2024-02-28 VITALS
OXYGEN SATURATION: 98 % | RESPIRATION RATE: 17 BRPM | SYSTOLIC BLOOD PRESSURE: 139 MMHG | DIASTOLIC BLOOD PRESSURE: 70 MMHG | HEART RATE: 90 BPM | TEMPERATURE: 99 F

## 2024-02-28 VITALS
OXYGEN SATURATION: 100 % | DIASTOLIC BLOOD PRESSURE: 86 MMHG | HEART RATE: 80 BPM | HEIGHT: 63 IN | TEMPERATURE: 98 F | WEIGHT: 121.03 LBS | RESPIRATION RATE: 14 BRPM | SYSTOLIC BLOOD PRESSURE: 155 MMHG

## 2024-02-28 DIAGNOSIS — Z98.890 OTHER SPECIFIED POSTPROCEDURAL STATES: Chronic | ICD-10-CM

## 2024-02-28 DIAGNOSIS — N65.1 DISPROPORTION OF RECONSTRUCTED BREAST: ICD-10-CM

## 2024-02-28 DIAGNOSIS — Z98.86 PERSONAL HISTORY OF BREAST IMPLANT REMOVAL: Chronic | ICD-10-CM

## 2024-02-28 DIAGNOSIS — Z98.891 HISTORY OF UTERINE SCAR FROM PREVIOUS SURGERY: Chronic | ICD-10-CM

## 2024-02-28 DIAGNOSIS — Z98.82 BREAST IMPLANT STATUS: Chronic | ICD-10-CM

## 2024-02-28 DIAGNOSIS — N64.89 OTHER SPECIFIED DISORDERS OF BREAST: ICD-10-CM

## 2024-02-28 DIAGNOSIS — Z90.89 ACQUIRED ABSENCE OF OTHER ORGANS: Chronic | ICD-10-CM

## 2024-02-28 DIAGNOSIS — Z98.86 PERSONAL HISTORY OF BREAST IMPLANT REMOVAL: ICD-10-CM

## 2024-02-28 PROCEDURE — 15771 GRFG AUTOL FAT LIPO 50 CC/<: CPT

## 2024-02-28 PROCEDURE — 15772 GRFG AUTOL FAT LIPO EA ADDL: CPT

## 2024-02-28 PROCEDURE — 19380 REVJ RECONSTRUCTED BREAST: CPT | Mod: 50

## 2024-02-28 PROCEDURE — C9399: CPT

## 2024-02-28 RX ORDER — FENTANYL CITRATE 50 UG/ML
50 INJECTION INTRAVENOUS
Refills: 0 | Status: DISCONTINUED | OUTPATIENT
Start: 2024-02-28 | End: 2024-02-28

## 2024-02-28 RX ORDER — ASCORBIC ACID 60 MG
1 TABLET,CHEWABLE ORAL
Refills: 0 | DISCHARGE

## 2024-02-28 RX ORDER — SODIUM CHLORIDE 9 MG/ML
1000 INJECTION, SOLUTION INTRAVENOUS
Refills: 0 | Status: DISCONTINUED | OUTPATIENT
Start: 2024-02-28 | End: 2024-02-28

## 2024-02-28 RX ORDER — OXYCODONE HYDROCHLORIDE 5 MG/1
5 TABLET ORAL ONCE
Refills: 0 | Status: DISCONTINUED | OUTPATIENT
Start: 2024-02-28 | End: 2024-02-28

## 2024-02-28 RX ORDER — ONDANSETRON 8 MG/1
4 TABLET, FILM COATED ORAL ONCE
Refills: 0 | Status: DISCONTINUED | OUTPATIENT
Start: 2024-02-28 | End: 2024-02-28

## 2024-02-28 RX ORDER — FENTANYL CITRATE 50 UG/ML
25 INJECTION INTRAVENOUS
Refills: 0 | Status: DISCONTINUED | OUTPATIENT
Start: 2024-02-28 | End: 2024-02-28

## 2024-02-28 RX ORDER — MAGNESIUM GLYCINATE 100 MG
2 CAPSULE ORAL
Refills: 0 | DISCHARGE

## 2024-02-28 RX ORDER — APREPITANT 80 MG/1
40 CAPSULE ORAL ONCE
Refills: 0 | Status: COMPLETED | OUTPATIENT
Start: 2024-02-28 | End: 2024-02-28

## 2024-02-28 RX ADMIN — FENTANYL CITRATE 50 MICROGRAM(S): 50 INJECTION INTRAVENOUS at 17:45

## 2024-02-28 RX ADMIN — FENTANYL CITRATE 50 MICROGRAM(S): 50 INJECTION INTRAVENOUS at 18:00

## 2024-02-28 RX ADMIN — OXYCODONE HYDROCHLORIDE 5 MILLIGRAM(S): 5 TABLET ORAL at 17:46

## 2024-02-28 RX ADMIN — FENTANYL CITRATE 50 MICROGRAM(S): 50 INJECTION INTRAVENOUS at 18:15

## 2024-02-28 RX ADMIN — APREPITANT 40 MILLIGRAM(S): 80 CAPSULE ORAL at 14:32

## 2024-02-28 RX ADMIN — FENTANYL CITRATE 50 MICROGRAM(S): 50 INJECTION INTRAVENOUS at 17:32

## 2024-02-28 RX ADMIN — OXYCODONE HYDROCHLORIDE 5 MILLIGRAM(S): 5 TABLET ORAL at 18:15

## 2024-02-28 RX ADMIN — FENTANYL CITRATE 50 MICROGRAM(S): 50 INJECTION INTRAVENOUS at 18:07

## 2024-02-28 NOTE — ASU PREOP CHECKLIST - WEIGHT IN KG
54.9 OT evaluate and treat  Ambulate with assistance OT evaluate and treat  Ambulate with assistance  FUNCTIONAL EVALUATION COMPLETED 4/29/22.

## 2024-02-28 NOTE — ASU DISCHARGE PLAN (ADULT/PEDIATRIC) - ASU DC SPECIAL INSTRUCTIONSFT
POST OPERATIVE INSTRUCTIONS    Dressings:   * You will have dressings over your surgical incisions. Please leave these dressings in place and dry for 48 hours (about 2 days). You may sponge bathe only, making sure to keep the surgical areas dry.      * AFTER 48 hours, you may remove the outer dressings only, leaving any steri strips, surgical tape and adhesive in place. After the outer dressings are removed, you may shower. Do not scrub directly on the incisions, and pat everything dry when you are finished. You do not need to replace the outer gauze after showering, only the steri strips or surgical tape.      Drains: (If your surgery does not require drains, you may skip this section)     * Please keep track of the output of your drains and write the values down. To empty the drains, open the cap from the top of the bulb to take it off suction, record how much is in the bulb based off the measuring values on the side of the drain bulb, and empty the contents into the cup that is provided to you.      * Make sure to keep track of the measurements for the right and left drains separately, if you have more than one drain.     * Measure the output twice a day. Make sure to document the time, date, and amount for each drain     * Bring your drain output log with you to your first post-operative appointment! This will gauge if the drains will be ready to be removed or not.      ***Once you are done emptying the drain bulbs, it is very important that you put the drains back on suction. To do this, squeeze the bulb prior to putting the cap back on. The bulb should remain on suction and should appear flat/compressed at ALL times.      Medications:     * Prior to your surgery day, all medications will be sent to your pharmacy and should be picked up.      * For the first 48 hours following the procedure, please alternate taking 1000mg Tylenol  mg of Ibuprofen every 4 hours (as long as you do not have any contraindications for taking either one).   EXAMPLE:  8 AM- 1000 mg Tylenol   12 PM- 400 mg Ibuprofen   4 PM- 1000 mg Tylenol   8 PM - 400 mg Ibuprofen   12 AM - 1000mg Tylenol     * If you are still having pain after following the above regimen, you can take the prescribed Oxycodone pain medication. Please take the prescription as prescribed every 6 hours as needed.      * After 48 hours following the procedure, take the Tylenol and Ibuprofen as needed.     * Please take prescribed antibiotics until finished      Activity:     * Following the procedure, you can walk for exercise, being mindful not to raise your heart rate too high which may cause bleeding. Walking is strongly encouraged, especially during the first 3 weeks.      * Please avoid vigorous exercising, lifting more than 5-10 lbs, overstretching the upper body, excessive bending.      * Try to avoid sleeping on your sides, or directly on your stomach for the first 4 weeks following the surgery. After about a month of recovery, you may return to side sleeping.     CALL THE OFFICE ([phone]) IMMEDIATELY IF YOU EXPERIENCE ANY OF THE FOLLOWING:     A high fever, (over 101º) severe nausea and vomiting, continued dizziness or incoherent behavior, such as hallucinations.     Any pain that cannot be controlled by your pain medication.     Bright red skin that is hot to the touch.     Excessive bleeding or fluid seeping through the incisions.     A severely misshapen breast or bruising that is localized to one breast or region of the chest.     You will only be released to the care of a responsible adult. All of these instructions must be clear to the adult who will monitor your health and support you, around the clock in the first 24 hours after surgery.     NO SMOKING OR ALCOHOL CONSUMPTION     Follow Up: The office will call you to schedule your post-operative office visit, which should take place about 1 week after surgery

## 2024-02-28 NOTE — ASU DISCHARGE PLAN (ADULT/PEDIATRIC) - CARE PROVIDER_API CALL
Ronan Owen  Plastic Surgery  [address] [place] [id number]  Phone: [phone]  Fax: [phone]  Follow Up Time:

## 2024-02-29 RX ORDER — OXYCODONE 10 MG/1
10 TABLET ORAL
Qty: 10 | Refills: 0 | Status: ACTIVE | COMMUNITY
Start: 2024-02-29 | End: 1900-01-01

## 2024-03-06 ENCOUNTER — APPOINTMENT (OUTPATIENT)
Dept: PLASTIC SURGERY | Facility: CLINIC | Age: 59
End: 2024-03-06
Payer: COMMERCIAL

## 2024-03-06 VITALS
HEART RATE: 77 BPM | SYSTOLIC BLOOD PRESSURE: 154 MMHG | HEIGHT: 63 IN | TEMPERATURE: 98 F | BODY MASS INDEX: 20.55 KG/M2 | WEIGHT: 116 LBS | OXYGEN SATURATION: 100 % | DIASTOLIC BLOOD PRESSURE: 86 MMHG

## 2024-03-06 DIAGNOSIS — Z98.86 PERSONAL HISTORY OF BREAST IMPLANT REMOVAL: ICD-10-CM

## 2024-03-06 PROCEDURE — 99024 POSTOP FOLLOW-UP VISIT: CPT

## 2024-03-08 PROBLEM — Z98.86 S/P BREAST IMPLANT REMOVAL: Status: ACTIVE | Noted: 2022-02-01

## 2024-03-08 RX ORDER — OXYCODONE 10 MG/1
10 TABLET ORAL
Qty: 10 | Refills: 0 | Status: ACTIVE | COMMUNITY
Start: 2024-03-08 | End: 1900-01-01

## 2024-03-08 NOTE — HISTORY OF PRESENT ILLNESS
[FreeTextEntry1] : Ms. 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 Pt is s/p b/l breast implant removal, capsulectomy, mastopexy, and alloderm placement 1/28/22 She is now s/p breast revision with b/l breast fat grafting and scar revision 2/28/24 She is doing ok, she has been having some pain at her liposuction sites but the pain is manageable with an increase in the pain medication  Denies fever, chills, LE pain/edema

## 2024-03-08 NOTE — PHYSICAL EXAM
[NI] : Normal [de-identified] : b/l breasts are soft and symmetrical incisions c/d/i nipples viable no palpable fluid collections or signs of infection medial dimpling of b/l breast improved with fat grafting fat grafted areas soft  [de-identified] : incisions c/d/i no signs of significant bruising or swelling left flank tender to palpation in liposuction area no signs of infection

## 2024-03-08 NOTE — ASSESSMENT
[FreeTextEntry1] : 57 yo female s/p revision of breast surgery 2/28/24 doing ok monitor for redness, swelling, fever, chills no heavy lifting or strenuous activity continue to wear soft, non wire bra she is encouraged to call if there are any changes all pt questions answered

## 2024-03-27 ENCOUNTER — APPOINTMENT (OUTPATIENT)
Dept: PLASTIC SURGERY | Facility: CLINIC | Age: 59
End: 2024-03-27
Payer: COMMERCIAL

## 2024-03-27 VITALS
TEMPERATURE: 97.8 F | OXYGEN SATURATION: 100 % | HEART RATE: 60 BPM | SYSTOLIC BLOOD PRESSURE: 159 MMHG | DIASTOLIC BLOOD PRESSURE: 98 MMHG

## 2024-03-27 DIAGNOSIS — Z98.890 OTHER SPECIFIED POSTPROCEDURAL STATES: ICD-10-CM

## 2024-03-27 PROCEDURE — 99024 POSTOP FOLLOW-UP VISIT: CPT

## 2024-03-29 PROBLEM — Z98.890 S/P SCAR REVISION: Status: ACTIVE | Noted: 2023-09-19

## 2024-03-29 NOTE — HISTORY OF PRESENT ILLNESS
[FreeTextEntry1] : Ms. 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 Pt is s/p b/l breast implant removal, capsulectomy, mastopexy, and alloderm placement 1/28/22 She is now s/p breast revision with b/l breast fat grafting and scar revision 2/28/24 Pain improved  Denies fever, chills, LE pain/edema

## 2024-03-29 NOTE — PHYSICAL EXAM
[NI] : Normal [de-identified] : b/l breasts are soft and symmetrical incisions c/d/i nipples viable no palpable fluid collections or signs of infection medial dimpling of b/l breast improved with fat grafting fat grafted areas soft  [de-identified] : incisions c/d/i no signs of significant bruising or swelling no signs of infection

## 2024-03-29 NOTE — ASSESSMENT
[FreeTextEntry1] : 57 yo female s/p revision of breast surgery 2/28/24 doing ok monitor for redness, swelling, fever, chills no heavy lifting or strenuous activity for another 2 weeks continue to wear soft, non wire bra she is encouraged to call if there are any changes all pt questions answered

## 2024-04-16 ENCOUNTER — APPOINTMENT (OUTPATIENT)
Dept: PLASTIC SURGERY | Facility: CLINIC | Age: 59
End: 2024-04-16

## 2024-04-18 ENCOUNTER — APPOINTMENT (OUTPATIENT)
Dept: PLASTIC SURGERY | Facility: CLINIC | Age: 59
End: 2024-04-18

## 2024-06-06 ENCOUNTER — RESULT REVIEW (OUTPATIENT)
Age: 59
End: 2024-06-06

## 2024-06-24 ENCOUNTER — OUTPATIENT (OUTPATIENT)
Dept: OUTPATIENT SERVICES | Facility: HOSPITAL | Age: 59
LOS: 1 days | End: 2024-06-24
Payer: COMMERCIAL

## 2024-06-24 ENCOUNTER — APPOINTMENT (OUTPATIENT)
Dept: ULTRASOUND IMAGING | Facility: CLINIC | Age: 59
End: 2024-06-24
Payer: COMMERCIAL

## 2024-06-24 ENCOUNTER — RESULT REVIEW (OUTPATIENT)
Age: 59
End: 2024-06-24

## 2024-06-24 DIAGNOSIS — Z98.890 OTHER SPECIFIED POSTPROCEDURAL STATES: Chronic | ICD-10-CM

## 2024-06-24 DIAGNOSIS — Z98.891 HISTORY OF UTERINE SCAR FROM PREVIOUS SURGERY: Chronic | ICD-10-CM

## 2024-06-24 DIAGNOSIS — Z90.89 ACQUIRED ABSENCE OF OTHER ORGANS: Chronic | ICD-10-CM

## 2024-06-24 DIAGNOSIS — Z00.8 ENCOUNTER FOR OTHER GENERAL EXAMINATION: ICD-10-CM

## 2024-06-24 DIAGNOSIS — Z98.86 PERSONAL HISTORY OF BREAST IMPLANT REMOVAL: ICD-10-CM

## 2024-06-24 DIAGNOSIS — Z98.82 BREAST IMPLANT STATUS: Chronic | ICD-10-CM

## 2024-06-24 DIAGNOSIS — Z98.86 PERSONAL HISTORY OF BREAST IMPLANT REMOVAL: Chronic | ICD-10-CM

## 2024-06-24 PROCEDURE — 76642 ULTRASOUND BREAST LIMITED: CPT | Mod: 26,50

## 2024-06-24 PROCEDURE — 76642 ULTRASOUND BREAST LIMITED: CPT

## 2024-06-25 ENCOUNTER — NON-APPOINTMENT (OUTPATIENT)
Age: 59
End: 2024-06-25

## 2024-06-27 ENCOUNTER — APPOINTMENT (OUTPATIENT)
Dept: ULTRASOUND IMAGING | Facility: CLINIC | Age: 59
End: 2024-06-27
Payer: COMMERCIAL

## 2024-06-27 ENCOUNTER — RESULT REVIEW (OUTPATIENT)
Age: 59
End: 2024-06-27

## 2024-06-27 ENCOUNTER — OUTPATIENT (OUTPATIENT)
Dept: OUTPATIENT SERVICES | Facility: HOSPITAL | Age: 59
LOS: 1 days | End: 2024-06-27
Payer: COMMERCIAL

## 2024-06-27 DIAGNOSIS — Z00.8 ENCOUNTER FOR OTHER GENERAL EXAMINATION: ICD-10-CM

## 2024-06-27 DIAGNOSIS — Z98.890 OTHER SPECIFIED POSTPROCEDURAL STATES: Chronic | ICD-10-CM

## 2024-06-27 DIAGNOSIS — Z98.82 BREAST IMPLANT STATUS: Chronic | ICD-10-CM

## 2024-06-27 DIAGNOSIS — Z98.86 PERSONAL HISTORY OF BREAST IMPLANT REMOVAL: Chronic | ICD-10-CM

## 2024-06-27 DIAGNOSIS — Z98.890 OTHER SPECIFIED POSTPROCEDURAL STATES: ICD-10-CM

## 2024-06-27 DIAGNOSIS — Z90.89 ACQUIRED ABSENCE OF OTHER ORGANS: Chronic | ICD-10-CM

## 2024-06-27 DIAGNOSIS — Z98.86 PERSONAL HISTORY OF BREAST IMPLANT REMOVAL: ICD-10-CM

## 2024-06-27 DIAGNOSIS — Z98.891 HISTORY OF UTERINE SCAR FROM PREVIOUS SURGERY: Chronic | ICD-10-CM

## 2024-06-27 PROCEDURE — 19083 BX BREAST 1ST LESION US IMAG: CPT

## 2024-06-27 PROCEDURE — 19083 BX BREAST 1ST LESION US IMAG: CPT | Mod: LT

## 2024-06-27 PROCEDURE — 88305 TISSUE EXAM BY PATHOLOGIST: CPT | Mod: 26

## 2024-06-27 PROCEDURE — 77065 DX MAMMO INCL CAD UNI: CPT | Mod: 26,LT

## 2024-06-27 PROCEDURE — 77065 DX MAMMO INCL CAD UNI: CPT

## 2024-06-27 PROCEDURE — 88305 TISSUE EXAM BY PATHOLOGIST: CPT

## 2024-07-05 ENCOUNTER — NON-APPOINTMENT (OUTPATIENT)
Age: 59
End: 2024-07-05

## 2025-04-08 ENCOUNTER — APPOINTMENT (OUTPATIENT)
Dept: PLASTIC SURGERY | Facility: CLINIC | Age: 60
End: 2025-04-08
Payer: COMMERCIAL

## 2025-04-08 VITALS
DIASTOLIC BLOOD PRESSURE: 92 MMHG | OXYGEN SATURATION: 99 % | SYSTOLIC BLOOD PRESSURE: 160 MMHG | HEART RATE: 81 BPM | TEMPERATURE: 97.6 F

## 2025-04-08 DIAGNOSIS — Z98.890 OTHER SPECIFIED POSTPROCEDURAL STATES: ICD-10-CM

## 2025-04-08 DIAGNOSIS — Z98.86 PERSONAL HISTORY OF BREAST IMPLANT REMOVAL: ICD-10-CM

## 2025-04-08 PROCEDURE — 99214 OFFICE O/P EST MOD 30 MIN: CPT

## 2025-05-27 ENCOUNTER — APPOINTMENT (OUTPATIENT)
Dept: PLASTIC SURGERY | Facility: CLINIC | Age: 60
End: 2025-05-27

## 2025-05-29 RX ORDER — OXYCODONE 5 MG/1
5 TABLET ORAL
Qty: 10 | Refills: 0 | Status: ACTIVE | COMMUNITY
Start: 2025-05-29 | End: 1900-01-01

## 2025-05-29 RX ORDER — CEFADROXIL 500 MG/1
500 CAPSULE ORAL TWICE DAILY
Qty: 6 | Refills: 0 | Status: ACTIVE | COMMUNITY
Start: 2025-05-29 | End: 1900-01-01

## 2025-05-29 RX ORDER — ONDANSETRON 4 MG/1
4 TABLET ORAL EVERY 8 HOURS
Qty: 9 | Refills: 0 | Status: ACTIVE | COMMUNITY
Start: 2025-05-29 | End: 1900-01-01

## 2025-06-02 ENCOUNTER — APPOINTMENT (OUTPATIENT)
Dept: PLASTIC SURGERY | Facility: AMBULATORY SURGERY CENTER | Age: 60
End: 2025-06-02
Payer: COMMERCIAL

## 2025-06-02 ENCOUNTER — APPOINTMENT (OUTPATIENT)
Dept: PLASTIC SURGERY | Facility: AMBULATORY SURGERY CENTER | Age: 60
End: 2025-06-02
Payer: SELF-PAY

## 2025-06-02 PROCEDURE — 14000 TIS TRNFR TRUNK 10 SQ CM/<: CPT

## 2025-06-02 PROCEDURE — 19325 BREAST AUGMENTATION W/IMPLT: CPT

## 2025-06-02 PROCEDURE — 15777 ACELLULAR DERM MATRIX IMPLT: CPT

## 2025-06-03 RX ORDER — OXYCODONE 5 MG/1
5 TABLET ORAL
Qty: 12 | Refills: 0 | Status: ACTIVE | COMMUNITY
Start: 2025-06-03 | End: 1900-01-01

## 2025-06-09 ENCOUNTER — APPOINTMENT (OUTPATIENT)
Dept: PLASTIC SURGERY | Facility: CLINIC | Age: 60
End: 2025-06-09
Payer: COMMERCIAL

## 2025-06-09 PROCEDURE — 99024 POSTOP FOLLOW-UP VISIT: CPT

## 2025-06-09 RX ORDER — OXYCODONE AND ACETAMINOPHEN 5; 325 MG/1; MG/1
5-325 TABLET ORAL
Qty: 12 | Refills: 0 | Status: ACTIVE | COMMUNITY
Start: 2025-06-09 | End: 1900-01-01

## 2025-06-19 ENCOUNTER — APPOINTMENT (OUTPATIENT)
Dept: PLASTIC SURGERY | Facility: CLINIC | Age: 60
End: 2025-06-19

## 2025-06-20 ENCOUNTER — NON-APPOINTMENT (OUTPATIENT)
Age: 60
End: 2025-06-20

## 2025-06-27 ENCOUNTER — NON-APPOINTMENT (OUTPATIENT)
Age: 60
End: 2025-06-27

## 2025-07-10 ENCOUNTER — APPOINTMENT (OUTPATIENT)
Dept: PLASTIC SURGERY | Facility: CLINIC | Age: 60
End: 2025-07-10

## 2025-07-10 PROCEDURE — 99024 POSTOP FOLLOW-UP VISIT: CPT

## (undated) DEVICE — PACK OR PLASTIC

## (undated) DEVICE — Device

## (undated) DEVICE — NDL HYPO REGULAR BEVEL 25G X 1.5"

## (undated) DEVICE — SUT VICRYL 3-0 27" SH UNDYED

## (undated) DEVICE — SUT VICRYL 2-0 27" SH UNDYED

## (undated) DEVICE — SUT DERMABOND PRINEO 60CM

## (undated) DEVICE — DRAPE SPLIT SHEETS 77X108"

## (undated) DEVICE — DRAIN RESERVOIR FOR JACKSON PRATT 100CC CARDINAL

## (undated) DEVICE — SYR CONTROL LUER LOK 10CC

## (undated) DEVICE — DRAPE FLUID WARMING

## (undated) DEVICE — PREP CHLORAPREP ORANGE 2PCT 26ML

## (undated) DEVICE — ELCTR EDGE BOVIE INSULATED BLADE TIP 2.75"

## (undated) DEVICE — WRAP COMPRESSION CALF MED

## (undated) DEVICE — BLANKET WARMER LOWER ADULT

## (undated) DEVICE — DRSG XEROFORM 5"

## (undated) DEVICE — DRSG MAMMARY SUPPORT LG SIZE 4

## (undated) DEVICE — DRAPE HALF SHEET 40X57"

## (undated) DEVICE — DRSG MAMMARY SUPPORT MED SIZE 2

## (undated) DEVICE — DRAIN BLAKE #19

## (undated) DEVICE — GLV 8 PROTEXIS

## (undated) DEVICE — SUT NYLON 3-0 18" PS-2

## (undated) DEVICE — GLV 7.5 PROTEXIS

## (undated) DEVICE — SUT PDS II 2-0 27" CT-1

## (undated) DEVICE — DRAPE CHEST BREAST 106X122"

## (undated) DEVICE — ELCTR GROUNDING PAD ADULT COVIDIEN

## (undated) DEVICE — SUT MONOCRYL 3-0 27" PS-2 UNDYED

## (undated) DEVICE — DRSG MAMMARY SUPPORT MED SIZE 3

## (undated) DEVICE — MARKER SKIN MULTI TIP 6"

## (undated) DEVICE — DRAPE TOWEL BLUE 17" X 24"

## (undated) DEVICE — DRSG MAMMARY SUPPORT XL SIZE 5